# Patient Record
Sex: MALE | Race: WHITE | NOT HISPANIC OR LATINO | Employment: OTHER | URBAN - METROPOLITAN AREA
[De-identification: names, ages, dates, MRNs, and addresses within clinical notes are randomized per-mention and may not be internally consistent; named-entity substitution may affect disease eponyms.]

---

## 2017-11-18 ENCOUNTER — HOSPITAL ENCOUNTER (EMERGENCY)
Facility: MEDICAL CENTER | Age: 42
End: 2017-11-18
Attending: EMERGENCY MEDICINE
Payer: COMMERCIAL

## 2017-11-18 VITALS
DIASTOLIC BLOOD PRESSURE: 86 MMHG | SYSTOLIC BLOOD PRESSURE: 106 MMHG | WEIGHT: 192.46 LBS | HEART RATE: 102 BPM | TEMPERATURE: 98.9 F | BODY MASS INDEX: 26.07 KG/M2 | HEIGHT: 72 IN | RESPIRATION RATE: 18 BRPM | OXYGEN SATURATION: 97 %

## 2017-11-18 DIAGNOSIS — L02.91 ABSCESS: ICD-10-CM

## 2017-11-18 DIAGNOSIS — L03.114 CELLULITIS OF LEFT UPPER EXTREMITY: ICD-10-CM

## 2017-11-18 PROCEDURE — 303977 HCHG I & D

## 2017-11-18 PROCEDURE — 99283 EMERGENCY DEPT VISIT LOW MDM: CPT

## 2017-11-18 RX ORDER — HYDROCODONE BITARTRATE AND ACETAMINOPHEN 5; 325 MG/1; MG/1
1-2 TABLET ORAL EVERY 6 HOURS PRN
Qty: 6 TAB | Refills: 0 | Status: ON HOLD | OUTPATIENT
Start: 2017-11-18 | End: 2022-01-01

## 2017-11-18 RX ORDER — SULFAMETHOXAZOLE AND TRIMETHOPRIM 800; 160 MG/1; MG/1
1 TABLET ORAL 2 TIMES DAILY
Qty: 14 TAB | Refills: 0 | Status: SHIPPED | OUTPATIENT
Start: 2017-11-18 | End: 2017-11-23

## 2017-11-18 RX ORDER — CEPHALEXIN 500 MG/1
500 CAPSULE ORAL 3 TIMES DAILY
Qty: 15 CAP | Refills: 0 | Status: SHIPPED | OUTPATIENT
Start: 2017-11-18 | End: 2017-11-23

## 2017-11-18 ASSESSMENT — PAIN SCALES - GENERAL: PAINLEVEL_OUTOF10: 10

## 2017-11-18 ASSESSMENT — LIFESTYLE VARIABLES: DO YOU DRINK ALCOHOL: NO

## 2017-11-18 NOTE — ED NOTES
Pt ambulates to room.  Open wound noted to left elbow area.  Small red wound noted to R shin.  A&ox4.  Chart up for ERP evaluation.

## 2017-11-18 NOTE — ED PROVIDER NOTES
ED Provider Note    CHIEF COMPLAINT  Chief Complaint   Patient presents with   • Abscess     R shin dime sized raised red    • Wound Check     recent tattoo to L FA, peeling and ozzing       HPI  Fam Lopez is a 42 y.o. male who presents with pustule on his right lower leg for the last 2 days, he thinks he was bitten by a spider. No fever no chills. Has an additional complaint of a recent tattoo placed near the antecubital fossa of his left arm that he also SYSTEMS playing. No fever no chills no nausea no vomiting no diarrhea. Evidently the tattoo placement was very recent scan has been feeling. However he tells me that skin is peeled similar fashion on previous tattoos    REVIEW OF SYSTEMS  See HPI for further details. All other systems are negative.     PAST MEDICAL HISTORY  History reviewed. No pertinent past medical history.    FAMILY HISTORY  No family history on file.    SOCIAL HISTORY  Social History     Social History   • Marital status: Single     Spouse name: N/A   • Number of children: N/A   • Years of education: N/A     Social History Main Topics   • Smoking status: Current Every Day Smoker     Packs/day: 1.00     Types: Cigarettes   • Smokeless tobacco: Not on file   • Alcohol use Yes   • Drug use: No   • Sexual activity: Not on file     Other Topics Concern   • Not on file     Social History Narrative   • No narrative on file       SURGICAL HISTORY  History reviewed. No pertinent surgical history.    CURRENT MEDICATIONS  Home Medications    **Home medications have not yet been reviewed for this encounter**         ALLERGIES  No Known Allergies    PHYSICAL EXAM  VITAL SIGNS: /86   Pulse (!) 102   Temp 36.9 °C (98.5 °F) (Temporal)   Resp 18   Ht 1.829 m (6')   Wt 87.3 kg (192 lb 7.4 oz)   SpO2 97%   BMI 26.10 kg/m²   Constitutional: Well developed, Well nourished, No acute distress, Non-toxic appearance.   Extremities: Intact distal pulses,Right lower leg, lateral, upper portion of the right  lower leg there is a small pustule, No cyanosis, No clubbing.   Musculoskeletal: Good range of motion in all major joints. No tenderness to palpation or major deformities noted. Examination of the left upper arm, recent placement of a tattoo with some tenderness around the area of the tattoo about 2 inches in diameter. No swelling. Difficult to tell if there is redness or not because some of the tattoo is red. There is no evidence of an abscess  Neurologic: Alert & oriented x 3, Normal motor function, Normal sensory function, No focal deficits noted.   Psychiatric: Affect normal, Judgment normal, Mood normal.       RADIOLOGY/PROCEDURES      COURSE & MEDICAL DECISION MAKING  Pertinent Labs & Imaging studies reviewed. (See chart for details)    Has an abscess right lower leg and I have drained. Also cellulitis left upper arm. I'm placing him on Keflex, Bactrim, he is to return if no better in 12 hours.  FINAL IMPRESSION  1.   1. Abscess    2. Cellulitis of left upper extremity        2.   3.     Disposition  Procedure note: There is a small pustule, right upper portion of the right upper leg. She has a small abscess was drained by me with scissors. Small amount of pus.   Discharge instructions are understood. This patient is to return if fever vomiting or no better in 12 hours. Follow up with the [facility] clinic or private physician. Information sheets on sepsis and cellulitis  Electronically signed by: Brett Hope, 11/18/2017 3:32 PM

## 2017-11-18 NOTE — DISCHARGE INSTRUCTIONS
Cellulitis  Cellulitis is an infection of the skin and the tissue beneath it. The infected area is usually red and tender. Cellulitis occurs most often in the arms and lower legs.   CAUSES   Cellulitis is caused by bacteria that enter the skin through cracks or cuts in the skin. The most common types of bacteria that cause cellulitis are staphylococci and streptococci.  SIGNS AND SYMPTOMS   · Redness and warmth.  · Swelling.  · Tenderness or pain.  · Fever.  DIAGNOSIS   Your health care provider can usually determine what is wrong based on a physical exam. Blood tests may also be done.  TREATMENT   Treatment usually involves taking an antibiotic medicine.  HOME CARE INSTRUCTIONS   · Take your antibiotic medicine as directed by your health care provider. Finish the antibiotic even if you start to feel better.  · Keep the infected arm or leg elevated to reduce swelling.  · Apply a warm cloth to the affected area up to 4 times per day to relieve pain.  · Take medicines only as directed by your health care provider.  · Keep all follow-up visits as directed by your health care provider.  SEEK MEDICAL CARE IF:   · You notice red streaks coming from the infected area.  · Your red area gets larger or turns dark in color.  · Your bone or joint underneath the infected area becomes painful after the skin has healed.  · Your infection returns in the same area or another area.  · You notice a swollen bump in the infected area.  · You develop new symptoms.  · You have a fever.  SEEK IMMEDIATE MEDICAL CARE IF:   · You feel very sleepy.  · You develop vomiting or diarrhea.  · You have a general ill feeling (malaise) with muscle aches and pains.  MAKE SURE YOU:   · Understand these instructions.  · Will watch your condition.  · Will get help right away if you are not doing well or get worse.     This information is not intended to replace advice given to you by your health care provider. Make sure you discuss any questions you have  with your health care provider.     Document Released: 09/27/2006 Document Revised: 01/08/2016 Document Reviewed: 03/04/2013  [employer] Interactive Patient Education ©2016 [employer]      Cellulitis  Cellulitis is an infection of the skin and the tissue under the skin. The infected area is usually red and tender. This happens most often in the arms and lower legs.  HOME CARE   · Take your antibiotic medicine as told. Finish the medicine even if you start to feel better.  · Keep the infected arm or leg raised (elevated).  · Put a warm cloth on the area up to 4 times per day.  · Only take medicines as told by your doctor.  · Keep all doctor visits as told.  GET HELP IF:  · You see red streaks on the skin coming from the infected area.  · Your red area gets bigger or turns a dark color.  · Your bone or joint under the infected area is painful after the skin heals.  · Your infection comes back in the same area or different area.  · You have a puffy (swollen) bump in the infected area.  · You have new symptoms.  · You have a fever.  GET HELP RIGHT AWAY IF:   · You feel very sleepy.  · You throw up (vomit) or have watery poop (diarrhea).  · You feel sick and have muscle aches and pains.  MAKE SURE YOU:   · Understand these instructions.  · Will watch your condition.  · Will get help right away if you are not doing well or get worse.     This information is not intended to replace advice given to you by your health care provider. Make sure you discuss any questions you have with your health care provider.     Document Released: 06/05/2009 Document Revised: 01/08/2016 Document Reviewed: 03/04/2013  [employer] Interactive Patient Education ©2016 [employer].  Return if fever, vomiting or if no better in 12 hours.

## 2017-11-19 NOTE — ED NOTES
Discharge instructions given, pt verbalized understanding.  Prescription instructions given, pt verbalized understanding.  Understands NOT to drive or drink alcohol while taking narcotics.  A&ox4.  VSS.  Ambulates out of ER.

## 2018-01-29 ENCOUNTER — APPOINTMENT (OUTPATIENT)
Dept: RADIOLOGY | Facility: MEDICAL CENTER | Age: 43
End: 2018-01-29
Attending: EMERGENCY MEDICINE
Payer: COMMERCIAL

## 2018-01-29 ENCOUNTER — HOSPITAL ENCOUNTER (EMERGENCY)
Facility: MEDICAL CENTER | Age: 43
End: 2018-01-29
Attending: EMERGENCY MEDICINE
Payer: COMMERCIAL

## 2018-01-29 VITALS
TEMPERATURE: 98.3 F | OXYGEN SATURATION: 95 % | RESPIRATION RATE: 16 BRPM | WEIGHT: 190.48 LBS | BODY MASS INDEX: 25.8 KG/M2 | SYSTOLIC BLOOD PRESSURE: 138 MMHG | HEIGHT: 72 IN | HEART RATE: 81 BPM | DIASTOLIC BLOOD PRESSURE: 87 MMHG

## 2018-01-29 DIAGNOSIS — S60.052A CONTUSION OF LEFT LITTLE FINGER WITHOUT DAMAGE TO NAIL, INITIAL ENCOUNTER: ICD-10-CM
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
## 2018-01-29 DIAGNOSIS — S09.92XA NASAL INJURY, INITIAL ENCOUNTER: ICD-10-CM

## 2018-01-29 PROCEDURE — 73130 X-RAY EXAM OF HAND: CPT | Mod: LT

## 2018-01-29 PROCEDURE — 99284 EMERGENCY DEPT VISIT MOD MDM: CPT

## 2018-01-29 PROCEDURE — 302874 HCHG BANDAGE ACE 2 OR 3""

## 2018-01-29 ASSESSMENT — PAIN SCALES - GENERAL: PAINLEVEL_OUTOF10: 0

## 2018-01-29 NOTE — DISCHARGE INSTRUCTIONS
Nasal Fracture  A nasal fracture is a break or crack in the bones of the nose. A minor break usually heals in a month. You often will receive black eyes from a nasal fracture. This is not a cause for concern. The black eyes will go away over 1 to 2 weeks.   DIAGNOSIS   Your caregiver may want to examine you if you are concerned about a fracture of the nose. X-rays of the nose may not show a nasal fracture even when one is present. Sometimes your caregiver must wait 1 to 5 days after the injury to re-check the nose for alignment and to take additional X-rays. Sometimes the caregiver must wait until the swelling has gone down.  TREATMENT  Minor fractures that have caused no deformity often do not require treatment. More serious fractures where bones are displaced may require surgery. This will take place after the swelling is gone. Surgery will stabilize and align the fracture.  HOME CARE INSTRUCTIONS   · Put ice on the injured area.  ¨ Put ice in a plastic bag.  ¨ Place a towel between your skin and the bag.  ¨ Leave the ice on for 15-20 minutes, 03-04 times a day.  · Take medications as directed by your caregiver.  · Only take over-the-counter or prescription medicines for pain, discomfort, or fever as directed by your caregiver.  · If your nose starts bleeding, squeeze the soft parts of the nose against the center wall while you are sitting in an upright position for 10 minutes.  · Contact sports should be avoided for at least 3 to 4 weeks or as directed by your caregiver.  SEEK MEDICAL CARE IF:  · Your pain increases or becomes severe.  · You continue to have nosebleeds.  · The shape of your nose does not return to normal within 5 days.  · You have pus draining from the nose.  SEEK IMMEDIATE MEDICAL CARE IF:   · You have bleeding from your nose that does not stop after 20 minutes of pinching the nostrils closed and keeping ice on the nose.  · You have clear fluid draining from your nose.  · You notice a grape-like  swelling on the dividing wall between the nostrils (septum). This is a collection of blood (hematoma) that must be drained to help prevent infection.  · You have difficulty moving your eyes.  · You have recurrent vomiting.     This information is not intended to replace advice given to you by your health care provider. Make sure you discuss any questions you have with your health care provider.     Document Released: 12/15/2001 Document Revised: 03/11/2013 Document Reviewed: 01/25/2016  [employer] Interactive Patient Education ©2016 [employer].  Crush Injury, Fingers or Toes  A crush injury to the fingers or toes means the tissues have been damaged by being squeezed (compressed). There will be bleeding into the tissues and swelling. Often, blood will collect under the skin. When this happens, the skin on the finger often dies and may slough off (shed) 1 week to 10 days later. Usually, new skin is growing underneath. If the injury has been too severe and the tissue does not survive, the damaged tissue may begin to turn black over several days.   Wounds which occur because of the crushing may be stitched (sutured) shut. However, crush injuries are more likely to become infected than other injuries. These wounds may not be closed as tightly as other types of cuts to prevent infection. Nails involved are often lost. These usually grow back over several weeks.   DIAGNOSIS  X-rays may be taken to see if there is any injury to the bones.  TREATMENT  Broken bones (fractures) may be treated with splinting, depending on the fracture. Often, no treatment is required for fractures of the last bone in the fingers or toes.  HOME CARE INSTRUCTIONS   · The crushed part should be raised (elevated) above the heart or center of the chest as much as possible for the first several days or as directed. This helps with pain and lessens swelling. Less swelling increases the chances that the crushed part will survive.  · Put ice on the  injured area.  ¨ Put ice in a plastic bag.  ¨ Place a towel between your skin and the bag.  ¨ Leave the ice on for 15-20 minutes, 03-04 times a day for the first 2 days.  · Only take over-the-counter or prescription medicines for pain, discomfort, or fever as directed by your caregiver.  · Use your injured part only as directed.  · Change your bandages (dressings) as directed.  · Keep all follow-up appointments as directed by your caregiver. Not keeping your appointment could result in a chronic or permanent injury, pain, and disability. If there is any problem keeping the appointment, you must call to reschedule.  SEEK IMMEDIATE MEDICAL CARE IF:   · There is redness, swelling, or increasing pain in the wound area.  · Pus is coming from the wound.  · You have a fever.  · You notice a bad smell coming from the wound or dressing.  · The edges of the wound do not stay together after the sutures have been removed.  · You are unable to move the injured finger or toe.  MAKE SURE YOU:   · Understand these instructions.  · Will watch your condition.  · Will get help right away if you are not doing well or get worse.     This information is not intended to replace advice given to you by your health care provider. Make sure you discuss any questions you have with your health care provider.     Document Released: 12/18/2006 Document Revised: 03/11/2013 Document Reviewed: 05/04/2012  [employer] Interactive Patient Education ©2016 [employer]

## 2018-01-29 NOTE — ED PROVIDER NOTES
ED Provider Note    Scribed for Carolynn Neff M.D. by Xin Espinoza. 1/29/2018  1:30 PM    Primary care provider: Chris Love M.D.  Means of arrival: Walk-in  History obtained from: Patient  History limited by: None     CHIEF COMPLAINT  Chief Complaint   Patient presents with   • T-5000 GLF     Fell out of bed. Pain to lateral L hand.  Swelling to base of 5th finger.  No relief with ice.     CHERRY Lopez is a 42 y.o. male who presents to the Emergency Department following a ground level fall which occurred two days ago resulting in left lateral hand pain. Per patient, he was walking in the dark on his porch when he fell forward. At that time the patient hit the brdige of his nose and injured his left hand. He reports history of breaking his nose multiple times in the past and states he currently cannot breathe well out of his nose. He currently complains of nose pain but denies any other facial pain. Patient complains of swelling to the base of his pinky. He denies any previous injuries to his left hand. He tried icing the area with no improvement of pain. He denies loss of consciousness upon falling. Patient denies any other injuries at this time. He also reports having a cellulitis last month on the bicep area of his left arm which still has scar tissue and feels as if it is still healing. He denies development of any redness or warmth to the area recently. Patient denies history of diabetes or any other medical problems.     REVIEW OF SYSTEMS  HEENT: Positive for nose pain. No head injuries.   Neuro: No numbness. No loss of consciousness.  Musculoskeletal: Positive for left pinky pain and swelling.   SKIN: No contusions   E.  See history of present illness.     PAST MEDICAL HISTORY   No chronic illnesses reported.     SURGICAL HISTORY  patient denies any surgical history    SOCIAL HISTORY  Social History   Substance Use Topics   • Smoking status: Current Every Day Smoker     Packs/day: 1.00     Types:  Cigarettes   • Smokeless tobacco: Never Used   • Alcohol use Yes      History   Drug Use No       FAMILY HISTORY  History reviewed. No pertinent family history.    CURRENT MEDICATIONS  No daily medications.     ALLERGIES  No Known Allergies    PHYSICAL EXAM  VITAL SIGNS: /88   Pulse 91   Temp 36.8 °C (98.3 °F) (Temporal)   Resp 16   Ht 1.829 m (6')   Wt 86.4 kg (190 lb 7.6 oz)   SpO2 95%   BMI 25.83 kg/m²     Constitutional: No distress.  HENT: Normocephalic. Superficial scratch to the bridge of the nose. Septum is deviated to right. No septal hematoma. No other facial tenderness.   Skin: Old scar tissue to left bicep area. No redness, swelling or warmth.   Musculoskeletal: Swelling and tenderness to the fifth MCP joint of the left hand. Pain with flexion. No obvious bony deformity. No crepitus. Sensation is intact.   Vascular: Warm to touch good capillary refill.  Neurologic: Distally neurovascularly intact  Psychiatric: Affect normal    DIAGNOSTIC STUDIES / PROCEDURES    LABS  None.     RADIOLOGY  DX-HAND 3+ LEFT   Final Result      1.  No acute fracture      2.  Old, healed fracture distal aspect of fifth proximal phalanx        The radiologist's interpretation of all radiological studies have been reviewed by me.    COURSE & MEDICAL DECISION MAKING  Nursing notes, VS, PMSFHx reviewed in chart.     1:30 PM - Patient seen and examined at bedside. Ordered left hand x-ray to evaluate his symptoms.     2:32 PM Reviewed patient's radiology results, which are shown above. Ordered splint application.     2:35 PM Recheck: Patient re-evaluated at beside. I informed the patient of his radiology results, which reveal no evidence of fracture. I informed the patient I will provide him with follow-up information with our plastic surgeon on-call, Dr. Phillips for his nose injury. We also discussed resting his hand and wearing the splint we will provide for 1-2 weeks, based off of his symptomatic improvement. 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 I also will provide follow-up information for your on-call orthopedist, Dr. Fernandes. Patient was agreeable and had the opportunity for questions.     The patient will return for new or worsening symptoms and is stable at the time of discharge.    The patient is referred to a primary physician for blood pressure management, diabetic screening, and for all other preventative health concerns.    DISPOSITION:  Patient will be discharged home in stable condition.    FOLLOW UP:  Demond Phillips M.D.  [address]  [place] [address]  [phone]    Call in 1 day  to establish care, for recheck    Ramses Fernandes M.D.  [address]  [place] [address]  [phone]    Call in 1 day  for recheck, to establish care for hand      FINAL IMPRESSION  1. Nasal injury, initial encounter    2. Fall, initial encounter    3. Contusion of left little finger without damage to nail, initial encounter          Xin NEWELL (Scribe), am scribing for, and in the presence of, Carolynn Neff M.D..    Electronically signed by: Xin Espinoza (Scribe), 1/29/2018    Carolynn NEWELL M.D. personally performed the services described in this documentation, as scribed by Xin Espinoza in my presence, and it is both accurate and complete.    The note accurately reflects work and decisions made by me.  Carolynn Neff  1/29/2018  8:29 PM

## 2018-01-29 NOTE — ED TRIAGE NOTES
Fam Lopez 42 y.o. male   Chief Complaint   Patient presents with   • T-5000 GLF     Fell out of bed. Pain to lateral L hand.  Swelling to base of 5th finger.  No relief with ice.      Pt returned to lobby and educated on triage process.  Advised to notify RN with changes or concerns.

## 2021-01-01 ENCOUNTER — NON-PROVIDER VISIT (OUTPATIENT)
Dept: URGENT CARE | Facility: CLINIC | Age: 46
End: 2021-01-01

## 2021-01-01 DIAGNOSIS — Z02.1 PRE-EMPLOYMENT DRUG SCREENING: ICD-10-CM

## 2021-01-01 LAB
AMP AMPHETAMINE: NORMAL
COC COCAINE: NORMAL
INT CON NEG: NORMAL
INT CON POS: NORMAL
MET METHAMPHETAMINES: NORMAL
OPI OPIATES: NORMAL
PCP PHENCYCLIDINE: NORMAL
POC DRUG COMMENT 753798-OCCUPATIONAL HEALTH: NORMAL
THC: NORMAL

## 2021-01-01 PROCEDURE — 80305 DRUG TEST PRSMV DIR OPT OBS: CPT | Performed by: NURSE PRACTITIONER

## 2022-01-01 ENCOUNTER — HOSPITAL ENCOUNTER (INPATIENT)
Facility: MEDICAL CENTER | Age: 47
LOS: 2 days | DRG: 432 | End: 2022-03-14
Attending: EMERGENCY MEDICINE | Admitting: EMERGENCY MEDICINE
Payer: COMMERCIAL

## 2022-01-01 ENCOUNTER — ANESTHESIA EVENT (OUTPATIENT)
Dept: SURGERY | Facility: MEDICAL CENTER | Age: 47
DRG: 432 | End: 2022-01-01
Payer: COMMERCIAL

## 2022-01-01 ENCOUNTER — APPOINTMENT (OUTPATIENT)
Dept: RADIOLOGY | Facility: MEDICAL CENTER | Age: 47
DRG: 432 | End: 2022-01-01
Attending: INTERNAL MEDICINE
Payer: COMMERCIAL

## 2022-01-01 ENCOUNTER — HOSPICE ADMISSION (OUTPATIENT)
Dept: HOSPICE | Facility: HOSPICE | Age: 47
End: 2022-01-01

## 2022-01-01 ENCOUNTER — ANESTHESIA (OUTPATIENT)
Dept: SURGERY | Facility: MEDICAL CENTER | Age: 47
DRG: 432 | End: 2022-01-01
Payer: COMMERCIAL

## 2022-01-01 ENCOUNTER — APPOINTMENT (OUTPATIENT)
Dept: CARDIOLOGY | Facility: MEDICAL CENTER | Age: 47
DRG: 432 | End: 2022-01-01
Attending: INTERNAL MEDICINE
Payer: COMMERCIAL

## 2022-01-01 ENCOUNTER — APPOINTMENT (OUTPATIENT)
Dept: RADIOLOGY | Facility: MEDICAL CENTER | Age: 47
DRG: 432 | End: 2022-01-01
Attending: EMERGENCY MEDICINE
Payer: COMMERCIAL

## 2022-01-01 VITALS
WEIGHT: 218.03 LBS | SYSTOLIC BLOOD PRESSURE: 92 MMHG | TEMPERATURE: 99.3 F | DIASTOLIC BLOOD PRESSURE: 31 MMHG | HEIGHT: 71 IN | BODY MASS INDEX: 30.52 KG/M2 | OXYGEN SATURATION: 93 % | RESPIRATION RATE: 16 BRPM | HEART RATE: 121 BPM

## 2022-01-01 DIAGNOSIS — N17.9 AKI (ACUTE KIDNEY INJURY) (HCC): ICD-10-CM

## 2022-01-01 DIAGNOSIS — K92.2 UPPER GI BLEED: ICD-10-CM

## 2022-01-01 DIAGNOSIS — R57.9 SHOCK (HCC): ICD-10-CM

## 2022-01-01 DIAGNOSIS — K92.0 GASTROINTESTINAL HEMORRHAGE WITH HEMATEMESIS: ICD-10-CM

## 2022-01-01 DIAGNOSIS — R10.84 GENERALIZED ABDOMINAL PAIN: ICD-10-CM

## 2022-01-01 DIAGNOSIS — K92.0 HEMATEMESIS, PRESENCE OF NAUSEA NOT SPECIFIED: ICD-10-CM

## 2022-01-01 DIAGNOSIS — R18.8 OTHER ASCITES: ICD-10-CM

## 2022-01-01 DIAGNOSIS — D62 ACUTE BLOOD LOSS ANEMIA: ICD-10-CM

## 2022-01-01 DIAGNOSIS — D50.0 BLOOD LOSS ANEMIA: ICD-10-CM

## 2022-01-01 LAB
ABO + RH BLD: NORMAL
ABO GROUP BLD: NORMAL
ALBUMIN SERPL BCP-MCNC: 1.9 G/DL (ref 3.2–4.9)
ALBUMIN SERPL BCP-MCNC: 1.9 G/DL (ref 3.2–4.9)
ALBUMIN SERPL BCP-MCNC: 2.2 G/DL (ref 3.2–4.9)
ALBUMIN SERPL BCP-MCNC: 2.6 G/DL (ref 3.2–4.9)
ALBUMIN SERPL BCP-MCNC: 2.9 G/DL (ref 3.2–4.9)
ALBUMIN/GLOB SERPL: 0.7 G/DL
ALBUMIN/GLOB SERPL: 0.7 G/DL
ALBUMIN/GLOB SERPL: 0.8 G/DL
ALBUMIN/GLOB SERPL: 0.9 G/DL
ALBUMIN/GLOB SERPL: 1 G/DL
ALP SERPL-CCNC: 101 U/L (ref 30–99)
ALP SERPL-CCNC: 103 U/L (ref 30–99)
ALP SERPL-CCNC: 107 U/L (ref 30–99)
ALP SERPL-CCNC: 111 U/L (ref 30–99)
ALP SERPL-CCNC: 95 U/L (ref 30–99)
ALT SERPL-CCNC: 264 U/L (ref 2–50)
ALT SERPL-CCNC: 388 U/L (ref 2–50)
ALT SERPL-CCNC: 508 U/L (ref 2–50)
ALT SERPL-CCNC: 542 U/L (ref 2–50)
ALT SERPL-CCNC: 735 U/L (ref 2–50)
ANION GAP SERPL CALC-SCNC: 10 MMOL/L (ref 7–16)
ANION GAP SERPL CALC-SCNC: 11 MMOL/L (ref 7–16)
ANION GAP SERPL CALC-SCNC: 11 MMOL/L (ref 7–16)
ANION GAP SERPL CALC-SCNC: 15 MMOL/L (ref 7–16)
ANION GAP SERPL CALC-SCNC: 22 MMOL/L (ref 7–16)
ANION GAP SERPL CALC-SCNC: 23 MMOL/L (ref 7–16)
ANION GAP SERPL CALC-SCNC: 8 MMOL/L (ref 7–16)
ANISOCYTOSIS BLD QL SMEAR: ABNORMAL
APTT PPP: 39.8 SEC (ref 24.7–36)
AST SERPL-CCNC: 1066 U/L (ref 12–45)
AST SERPL-CCNC: 1610 U/L (ref 12–45)
AST SERPL-CCNC: 401 U/L (ref 12–45)
AST SERPL-CCNC: 646 U/L (ref 12–45)
AST SERPL-CCNC: 953 U/L (ref 12–45)
BARCODED ABORH UBTYP: 600
BARCODED ABORH UBTYP: 6200
BARCODED ABORH UBTYP: 9500
BARCODED PRD CODE UBPRD: NORMAL
BARCODED UNIT NUM UBUNT: NORMAL
BASE EXCESS BLDA CALC-SCNC: -10 MMOL/L (ref -4–3)
BASE EXCESS BLDA CALC-SCNC: -10 MMOL/L (ref -4–3)
BASE EXCESS BLDA CALC-SCNC: -14 MMOL/L (ref -4–3)
BASE EXCESS BLDA CALC-SCNC: -9 MMOL/L (ref -4–3)
BASOPHILS # BLD AUTO: 0.1 % (ref 0–1.8)
BASOPHILS # BLD AUTO: 1.7 % (ref 0–1.8)
BASOPHILS # BLD: 0.02 K/UL (ref 0–0.12)
BASOPHILS # BLD: 0.13 K/UL (ref 0–0.12)
BILIRUB SERPL-MCNC: 0.9 MG/DL (ref 0.1–1.5)
BILIRUB SERPL-MCNC: 1.3 MG/DL (ref 0.1–1.5)
BILIRUB SERPL-MCNC: 2.4 MG/DL (ref 0.1–1.5)
BILIRUB SERPL-MCNC: 2.8 MG/DL (ref 0.1–1.5)
BILIRUB SERPL-MCNC: 4.8 MG/DL (ref 0.1–1.5)
BLD GP AB SCN SERPL QL: NORMAL
BLOOD CULTURE HOLD CXBCH: NORMAL
BLOOD CULTURE HOLD CXBCH: NORMAL
BODY TEMPERATURE: ABNORMAL DEGREES
BREATHS SETTING VENT: 24
BREATHS SETTING VENT: 24
BREATHS SETTING VENT: 26
BUN SERPL-MCNC: 40 MG/DL (ref 8–22)
BUN SERPL-MCNC: 40 MG/DL (ref 8–22)
BUN SERPL-MCNC: 41 MG/DL (ref 8–22)
BUN SERPL-MCNC: 46 MG/DL (ref 8–22)
BUN SERPL-MCNC: 47 MG/DL (ref 8–22)
BUN SERPL-MCNC: 51 MG/DL (ref 8–22)
BUN SERPL-MCNC: 55 MG/DL (ref 8–22)
CA-I BLD ISE-SCNC: 1.07 MMOL/L (ref 1.1–1.3)
CA-I BLD ISE-SCNC: 1.28 MMOL/L (ref 1.1–1.3)
CA-I SERPL-SCNC: 1 MMOL/L (ref 1.1–1.3)
CA-I SERPL-SCNC: 1.1 MMOL/L (ref 1.1–1.3)
CALCIUM SERPL-MCNC: 7.7 MG/DL (ref 8.5–10.5)
CALCIUM SERPL-MCNC: 7.7 MG/DL (ref 8.5–10.5)
CALCIUM SERPL-MCNC: 7.8 MG/DL (ref 8.5–10.5)
CALCIUM SERPL-MCNC: 7.8 MG/DL (ref 8.5–10.5)
CALCIUM SERPL-MCNC: 8.2 MG/DL (ref 8.5–10.5)
CALCIUM SERPL-MCNC: 8.5 MG/DL (ref 8.5–10.5)
CALCIUM SERPL-MCNC: 9.1 MG/DL (ref 8.5–10.5)
CFT BLD TEG: 3.2 MIN (ref 4.6–9.1)
CFT BLD TEG: 4.2 MIN (ref 4.6–9.1)
CFT P HPASE BLD TEG: 3 MIN (ref 4.3–8.3)
CFT P HPASE BLD TEG: 4 MIN (ref 4.3–8.3)
CHLORIDE SERPL-SCNC: 101 MMOL/L (ref 96–112)
CHLORIDE SERPL-SCNC: 101 MMOL/L (ref 96–112)
CHLORIDE SERPL-SCNC: 102 MMOL/L (ref 96–112)
CHLORIDE SERPL-SCNC: 106 MMOL/L (ref 96–112)
CHLORIDE SERPL-SCNC: 106 MMOL/L (ref 96–112)
CHLORIDE SERPL-SCNC: 108 MMOL/L (ref 96–112)
CHLORIDE SERPL-SCNC: 110 MMOL/L (ref 96–112)
CLOT ANGLE BLD TEG: 77.8 DEGREES (ref 63–78)
CLOT ANGLE BLD TEG: 78.7 DEGREES (ref 63–78)
CLOT LYSIS 30M P MA LENFR BLD TEG: 1.1 % (ref 0–2.6)
CLOT LYSIS 30M P MA LENFR BLD TEG: 1.4 % (ref 0–2.6)
CO2 BLDA-SCNC: 15 MMOL/L (ref 20–33)
CO2 BLDA-SCNC: 18 MMOL/L (ref 20–33)
CO2 BLDA-SCNC: 18 MMOL/L (ref 20–33)
CO2 BLDA-SCNC: 22 MMOL/L (ref 20–33)
CO2 SERPL-SCNC: 11 MMOL/L (ref 20–33)
CO2 SERPL-SCNC: 12 MMOL/L (ref 20–33)
CO2 SERPL-SCNC: 15 MMOL/L (ref 20–33)
CO2 SERPL-SCNC: 15 MMOL/L (ref 20–33)
CO2 SERPL-SCNC: 16 MMOL/L (ref 20–33)
CO2 SERPL-SCNC: 17 MMOL/L (ref 20–33)
CO2 SERPL-SCNC: 18 MMOL/L (ref 20–33)
COMPONENT F 8504F: NORMAL
COMPONENT R 8504R: NORMAL
CORTIS SERPL-MCNC: 12.1 UG/DL (ref 0–23)
CREAT SERPL-MCNC: 1.41 MG/DL (ref 0.5–1.4)
CREAT SERPL-MCNC: 1.75 MG/DL (ref 0.5–1.4)
CREAT SERPL-MCNC: 1.82 MG/DL (ref 0.5–1.4)
CREAT SERPL-MCNC: 2.1 MG/DL (ref 0.5–1.4)
CREAT SERPL-MCNC: 2.34 MG/DL (ref 0.5–1.4)
CREAT SERPL-MCNC: 2.57 MG/DL (ref 0.5–1.4)
CREAT SERPL-MCNC: 2.89 MG/DL (ref 0.5–1.4)
CT.EXTRINSIC BLD ROTEM: 0.8 MIN (ref 0.8–2.1)
CT.EXTRINSIC BLD ROTEM: 0.9 MIN (ref 0.8–2.1)
DELSYS IDSYS: ABNORMAL
EKG IMPRESSION: NORMAL
END TIDAL CARBON DIOXIDE IECO2: 31 MMHG
END TIDAL CARBON DIOXIDE IECO2: 34 MMHG
END TIDAL CARBON DIOXIDE IECO2: 36 MMHG
EOSINOPHIL # BLD AUTO: 0 K/UL (ref 0–0.51)
EOSINOPHIL # BLD AUTO: 0 K/UL (ref 0–0.51)
EOSINOPHIL NFR BLD: 0 % (ref 0–6.9)
EOSINOPHIL NFR BLD: 0 % (ref 0–6.9)
ERYTHROCYTE [DISTWIDTH] IN BLOOD BY AUTOMATED COUNT: 70.9 FL (ref 35.9–50)
ERYTHROCYTE [DISTWIDTH] IN BLOOD BY AUTOMATED COUNT: 73.5 FL (ref 35.9–50)
ERYTHROCYTE [DISTWIDTH] IN BLOOD BY AUTOMATED COUNT: 76.7 FL (ref 35.9–50)
ERYTHROCYTE [DISTWIDTH] IN BLOOD BY AUTOMATED COUNT: 78.5 FL (ref 35.9–50)
EXTERNAL QUALITY CONTROL: NORMAL
GLOBULIN SER CALC-MCNC: 2.5 G/DL (ref 1.9–3.5)
GLOBULIN SER CALC-MCNC: 2.8 G/DL (ref 1.9–3.5)
GLOBULIN SER CALC-MCNC: 2.9 G/DL (ref 1.9–3.5)
GLOBULIN SER CALC-MCNC: 2.9 G/DL (ref 1.9–3.5)
GLOBULIN SER CALC-MCNC: 3 G/DL (ref 1.9–3.5)
GLUCOSE BLD STRIP.AUTO-MCNC: 103 MG/DL (ref 65–99)
GLUCOSE BLD STRIP.AUTO-MCNC: 56 MG/DL (ref 65–99)
GLUCOSE SERPL-MCNC: 105 MG/DL (ref 65–99)
GLUCOSE SERPL-MCNC: 123 MG/DL (ref 65–99)
GLUCOSE SERPL-MCNC: 52 MG/DL (ref 65–99)
GLUCOSE SERPL-MCNC: 70 MG/DL (ref 65–99)
GLUCOSE SERPL-MCNC: 94 MG/DL (ref 65–99)
GLUCOSE SERPL-MCNC: 97 MG/DL (ref 65–99)
GLUCOSE SERPL-MCNC: 98 MG/DL (ref 65–99)
GRAM STN SPEC: NORMAL
HAV IGM SERPL QL IA: ABNORMAL
HBV CORE IGM SER QL: ABNORMAL
HBV SURFACE AB SERPL IA-ACNC: <3.5 MIU/ML (ref 0–10)
HBV SURFACE AG SER QL: ABNORMAL
HCO3 BLDA-SCNC: 13.5 MMOL/L (ref 17–25)
HCO3 BLDA-SCNC: 17.1 MMOL/L (ref 17–25)
HCO3 BLDA-SCNC: 17.2 MMOL/L (ref 17–25)
HCO3 BLDA-SCNC: 20 MMOL/L (ref 17–25)
HCT VFR BLD AUTO: 17.6 % (ref 42–52)
HCT VFR BLD AUTO: 21.5 % (ref 42–52)
HCT VFR BLD AUTO: 23.5 % (ref 42–52)
HCT VFR BLD AUTO: 29.7 % (ref 42–52)
HCT VFR BLD AUTO: 29.9 % (ref 42–52)
HCT VFR BLD AUTO: 32.4 % (ref 42–52)
HCT VFR BLD CALC: 27 % (ref 42–52)
HCT VFR BLD CALC: 31 % (ref 42–52)
HCV AB SER QL: REACTIVE
HGB BLD-MCNC: 10.3 G/DL (ref 14–18)
HGB BLD-MCNC: 10.5 G/DL (ref 14–18)
HGB BLD-MCNC: 10.5 G/DL (ref 14–18)
HGB BLD-MCNC: 5.4 G/DL (ref 14–18)
HGB BLD-MCNC: 6.9 G/DL (ref 14–18)
HGB BLD-MCNC: 7.6 G/DL (ref 14–18)
HGB BLD-MCNC: 9.2 G/DL (ref 14–18)
HGB BLD-MCNC: 9.5 G/DL (ref 14–18)
HIV 1+2 AB+HIV1 P24 AG SERPL QL IA: NORMAL
HOROWITZ INDEX BLDA+IHG-RTO: 119 MM[HG]
HOROWITZ INDEX BLDA+IHG-RTO: 149 MM[HG]
HOROWITZ INDEX BLDA+IHG-RTO: 195 MM[HG]
HOROWITZ INDEX BLDA+IHG-RTO: 52 MM[HG]
IMM GRANULOCYTES # BLD AUTO: 0.22 K/UL (ref 0–0.11)
IMM GRANULOCYTES NFR BLD AUTO: 1.4 % (ref 0–0.9)
INR PPP: 1.64 (ref 0.87–1.13)
LACTATE BLD-SCNC: 10.9 MMOL/L (ref 0.5–2)
LACTATE BLD-SCNC: 3.4 MMOL/L (ref 0.5–2)
LACTATE BLD-SCNC: 9.9 MMOL/L (ref 0.5–2)
LIPASE SERPL-CCNC: 131 U/L (ref 11–82)
LV EJECT FRACT  99904: 70
LYMPHOCYTES # BLD AUTO: 1.76 K/UL (ref 1–4.8)
LYMPHOCYTES # BLD AUTO: 2.64 K/UL (ref 1–4.8)
LYMPHOCYTES NFR BLD: 11.6 % (ref 22–41)
LYMPHOCYTES NFR BLD: 33.9 % (ref 22–41)
MACROCYTES BLD QL SMEAR: ABNORMAL
MAGNESIUM SERPL-MCNC: 1.9 MG/DL (ref 1.5–2.5)
MAGNESIUM SERPL-MCNC: 2.1 MG/DL (ref 1.5–2.5)
MAGNESIUM SERPL-MCNC: 2.1 MG/DL (ref 1.5–2.5)
MANUAL DIFF BLD: NORMAL
MCF BLD TEG: 60.1 MM (ref 52–69)
MCF BLD TEG: 61.3 MM (ref 52–69)
MCF.PLATELET INHIB BLD ROTEM: 21.5 MM (ref 15–32)
MCF.PLATELET INHIB BLD ROTEM: 24 MM (ref 15–32)
MCH RBC QN AUTO: 32.7 PG (ref 27–33)
MCH RBC QN AUTO: 32.8 PG (ref 27–33)
MCH RBC QN AUTO: 33.7 PG (ref 27–33)
MCH RBC QN AUTO: 35.5 PG (ref 27–33)
MCHC RBC AUTO-ENTMCNC: 30.7 G/DL (ref 33.7–35.3)
MCHC RBC AUTO-ENTMCNC: 32 G/DL (ref 33.7–35.3)
MCHC RBC AUTO-ENTMCNC: 32.1 G/DL (ref 33.7–35.3)
MCHC RBC AUTO-ENTMCNC: 32.4 G/DL (ref 33.7–35.3)
MCV RBC AUTO: 100.9 FL (ref 81.4–97.8)
MCV RBC AUTO: 102.4 FL (ref 81.4–97.8)
MCV RBC AUTO: 104.9 FL (ref 81.4–97.8)
MCV RBC AUTO: 115.8 FL (ref 81.4–97.8)
MODE IMODE: ABNORMAL
MONOCYTES # BLD AUTO: 0.61 K/UL (ref 0–0.85)
MONOCYTES # BLD AUTO: 1.92 K/UL (ref 0–0.85)
MONOCYTES NFR BLD AUTO: 12.6 % (ref 0–13.4)
MONOCYTES NFR BLD AUTO: 7.8 % (ref 0–13.4)
MORPHOLOGY BLD-IMP: NORMAL
MYELOCYTES NFR BLD MANUAL: 0.9 %
NEUTROPHILS # BLD AUTO: 11.3 K/UL (ref 1.82–7.42)
NEUTROPHILS # BLD AUTO: 4.34 K/UL (ref 1.82–7.42)
NEUTROPHILS NFR BLD: 55.7 % (ref 44–72)
NEUTROPHILS NFR BLD: 74.3 % (ref 44–72)
NRBC # BLD AUTO: 0 K/UL
NRBC # BLD AUTO: 0.08 K/UL
NRBC BLD-RTO: 0 /100 WBC
NRBC BLD-RTO: 0.5 /100 WBC
O2/TOTAL GAS SETTING VFR VENT: 100 %
O2/TOTAL GAS SETTING VFR VENT: 100 %
O2/TOTAL GAS SETTING VFR VENT: 40 %
O2/TOTAL GAS SETTING VFR VENT: 70 %
PA AA BLD-ACNC: 16.4 % (ref 0–11)
PA AA BLD-ACNC: 9.9 % (ref 0–11)
PA ADP BLD-ACNC: 0 % (ref 0–17)
PA ADP BLD-ACNC: 14.9 % (ref 0–17)
PCO2 BLDA: 35.6 MMHG (ref 26–37)
PCO2 BLDA: 39 MMHG (ref 26–37)
PCO2 BLDA: 42.3 MMHG (ref 26–37)
PCO2 BLDA: 66.7 MMHG (ref 26–37)
PCO2 TEMP ADJ BLDA: 35.9 MMHG (ref 26–37)
PCO2 TEMP ADJ BLDA: 40.1 MMHG (ref 26–37)
PCO2 TEMP ADJ BLDA: 42.7 MMHG (ref 26–37)
PCO2 TEMP ADJ BLDA: 69.5 MMHG (ref 26–37)
PEEP END EXPIRATORY PRESSURE IPEEP: 14 CMH20
PEEP END EXPIRATORY PRESSURE IPEEP: 8 CMH20
PERCENT MINUTE VOLUME IPMV: 100
PH BLDA: 7.08 [PH] (ref 7.4–7.5)
PH BLDA: 7.15 [PH] (ref 7.4–7.5)
PH BLDA: 7.21 [PH] (ref 7.4–7.5)
PH BLDA: 7.29 [PH] (ref 7.4–7.5)
PH TEMP ADJ BLDA: 7.07 [PH] (ref 7.4–7.5)
PH TEMP ADJ BLDA: 7.14 [PH] (ref 7.4–7.5)
PH TEMP ADJ BLDA: 7.21 [PH] (ref 7.4–7.5)
PH TEMP ADJ BLDA: 7.29 [PH] (ref 7.4–7.5)
PHOSPHATE SERPL-MCNC: 4 MG/DL (ref 2.5–4.5)
PHOSPHATE SERPL-MCNC: 8.3 MG/DL (ref 2.5–4.5)
PLATELET # BLD AUTO: 160 K/UL (ref 164–446)
PLATELET # BLD AUTO: 164 K/UL (ref 164–446)
PLATELET # BLD AUTO: 169 K/UL (ref 164–446)
PLATELET # BLD AUTO: 173 K/UL (ref 164–446)
PLATELET BLD QL SMEAR: NORMAL
PMV BLD AUTO: 10.7 FL (ref 9–12.9)
PMV BLD AUTO: 11.2 FL (ref 9–12.9)
PMV BLD AUTO: 11.2 FL (ref 9–12.9)
PMV BLD AUTO: 11.4 FL (ref 9–12.9)
PO2 BLDA: 149 MMHG (ref 64–87)
PO2 BLDA: 52 MMHG (ref 64–87)
PO2 BLDA: 78 MMHG (ref 64–87)
PO2 BLDA: 83 MMHG (ref 64–87)
PO2 TEMP ADJ BLDA: 150 MMHG (ref 64–87)
PO2 TEMP ADJ BLDA: 56 MMHG (ref 64–87)
PO2 TEMP ADJ BLDA: 80 MMHG (ref 64–87)
PO2 TEMP ADJ BLDA: 86 MMHG (ref 64–87)
POLYCHROMASIA BLD QL SMEAR: NORMAL
POTASSIUM BLD-SCNC: 5.4 MMOL/L (ref 3.6–5.5)
POTASSIUM BLD-SCNC: 6.4 MMOL/L (ref 3.6–5.5)
POTASSIUM SERPL-SCNC: 5 MMOL/L (ref 3.6–5.5)
POTASSIUM SERPL-SCNC: 5.1 MMOL/L (ref 3.6–5.5)
POTASSIUM SERPL-SCNC: 5.8 MMOL/L (ref 3.6–5.5)
POTASSIUM SERPL-SCNC: 6.1 MMOL/L (ref 3.6–5.5)
POTASSIUM SERPL-SCNC: 6.4 MMOL/L (ref 3.6–5.5)
POTASSIUM SERPL-SCNC: 6.9 MMOL/L (ref 3.6–5.5)
POTASSIUM SERPL-SCNC: 7.2 MMOL/L (ref 3.6–5.5)
PRODUCT TYPE UPROD: NORMAL
PROT SERPL-MCNC: 4.4 G/DL (ref 6–8.2)
PROT SERPL-MCNC: 4.8 G/DL (ref 6–8.2)
PROT SERPL-MCNC: 5.2 G/DL (ref 6–8.2)
PROT SERPL-MCNC: 5.5 G/DL (ref 6–8.2)
PROT SERPL-MCNC: 5.7 G/DL (ref 6–8.2)
PROTHROMBIN TIME: 19 SEC (ref 12–14.6)
RBC # BLD AUTO: 1.52 M/UL (ref 4.7–6.1)
RBC # BLD AUTO: 2.05 M/UL (ref 4.7–6.1)
RBC # BLD AUTO: 2.9 M/UL (ref 4.7–6.1)
RBC # BLD AUTO: 3.21 M/UL (ref 4.7–6.1)
RBC BLD AUTO: PRESENT
RH BLD: NORMAL
SAO2 % BLDA: 71 % (ref 93–99)
SAO2 % BLDA: 92 % (ref 93–99)
SAO2 % BLDA: 94 % (ref 93–99)
SAO2 % BLDA: 99 % (ref 93–99)
SARS-COV+SARS-COV-2 AG RESP QL IA.RAPID: NEGATIVE
SIGNIFICANT IND 70042: NORMAL
SITE SITE: NORMAL
SMUDGE CELLS BLD QL SMEAR: NORMAL
SODIUM BLD-SCNC: 134 MMOL/L (ref 135–145)
SODIUM BLD-SCNC: 135 MMOL/L (ref 135–145)
SODIUM SERPL-SCNC: 132 MMOL/L (ref 135–145)
SODIUM SERPL-SCNC: 133 MMOL/L (ref 135–145)
SODIUM SERPL-SCNC: 134 MMOL/L (ref 135–145)
SODIUM SERPL-SCNC: 134 MMOL/L (ref 135–145)
SODIUM SERPL-SCNC: 135 MMOL/L (ref 135–145)
SOURCE SOURCE: NORMAL
SPECIMEN DRAWN FROM PATIENT: ABNORMAL
TEG ALGORITHM TGALG: ABNORMAL
TEG ALGORITHM TGALG: ABNORMAL
TIDAL VOLUME IVT: 450 ML
TSH SERPL DL<=0.005 MIU/L-ACNC: 2.37 UIU/ML (ref 0.38–5.33)
UNIT STATUS USTAT: NORMAL
WBC # BLD AUTO: 11.6 K/UL (ref 4.8–10.8)
WBC # BLD AUTO: 15.2 K/UL (ref 4.8–10.8)
WBC # BLD AUTO: 29 K/UL (ref 4.8–10.8)
WBC # BLD AUTO: 7.8 K/UL (ref 4.8–10.8)

## 2022-01-01 PROCEDURE — 30233N1 TRANSFUSION OF NONAUTOLOGOUS RED BLOOD CELLS INTO PERIPHERAL VEIN, PERCUTANEOUS APPROACH: ICD-10-PCS | Performed by: EMERGENCY MEDICINE

## 2022-01-01 PROCEDURE — 83605 ASSAY OF LACTIC ACID: CPT

## 2022-01-01 PROCEDURE — 30233K1 TRANSFUSION OF NONAUTOLOGOUS FROZEN PLASMA INTO PERIPHERAL VEIN, PERCUTANEOUS APPROACH: ICD-10-PCS | Performed by: EMERGENCY MEDICINE

## 2022-01-01 PROCEDURE — 86850 RBC ANTIBODY SCREEN: CPT

## 2022-01-01 PROCEDURE — 36620 INSERTION CATHETER ARTERY: CPT | Performed by: EMERGENCY MEDICINE

## 2022-01-01 PROCEDURE — 86923 COMPATIBILITY TEST ELECTRIC: CPT

## 2022-01-01 PROCEDURE — 80048 BASIC METABOLIC PNL TOTAL CA: CPT

## 2022-01-01 PROCEDURE — 03HY32Z INSERTION OF MONITORING DEVICE INTO UPPER ARTERY, PERCUTANEOUS APPROACH: ICD-10-PCS | Performed by: EMERGENCY MEDICINE

## 2022-01-01 PROCEDURE — 83735 ASSAY OF MAGNESIUM: CPT

## 2022-01-01 PROCEDURE — 80053 COMPREHEN METABOLIC PANEL: CPT

## 2022-01-01 PROCEDURE — 700111 HCHG RX REV CODE 636 W/ 250 OVERRIDE (IP): Performed by: EMERGENCY MEDICINE

## 2022-01-01 PROCEDURE — 700105 HCHG RX REV CODE 258: Performed by: INTERNAL MEDICINE

## 2022-01-01 PROCEDURE — 87426 SARSCOV CORONAVIRUS AG IA: CPT | Performed by: INTERNAL MEDICINE

## 2022-01-01 PROCEDURE — 36556 INSERT NON-TUNNEL CV CATH: CPT | Mod: RT,59 | Performed by: NURSE PRACTITIONER

## 2022-01-01 PROCEDURE — C9113 INJ PANTOPRAZOLE SODIUM, VIA: HCPCS | Performed by: EMERGENCY MEDICINE

## 2022-01-01 PROCEDURE — 700111 HCHG RX REV CODE 636 W/ 250 OVERRIDE (IP): Performed by: INTERNAL MEDICINE

## 2022-01-01 PROCEDURE — 99292 CRITICAL CARE ADDL 30 MIN: CPT | Mod: 25 | Performed by: INTERNAL MEDICINE

## 2022-01-01 PROCEDURE — 99291 CRITICAL CARE FIRST HOUR: CPT | Performed by: EMERGENCY MEDICINE

## 2022-01-01 PROCEDURE — 94002 VENT MGMT INPAT INIT DAY: CPT

## 2022-01-01 PROCEDURE — 37799 UNLISTED PX VASCULAR SURGERY: CPT

## 2022-01-01 PROCEDURE — 85027 COMPLETE CBC AUTOMATED: CPT

## 2022-01-01 PROCEDURE — 36620 INSERTION CATHETER ARTERY: CPT

## 2022-01-01 PROCEDURE — 36600 WITHDRAWAL OF ARTERIAL BLOOD: CPT

## 2022-01-01 PROCEDURE — 85610 PROTHROMBIN TIME: CPT

## 2022-01-01 PROCEDURE — 86901 BLOOD TYPING SEROLOGIC RH(D): CPT

## 2022-01-01 PROCEDURE — 84295 ASSAY OF SERUM SODIUM: CPT

## 2022-01-01 PROCEDURE — 02HV33Z INSERTION OF INFUSION DEVICE INTO SUPERIOR VENA CAVA, PERCUTANEOUS APPROACH: ICD-10-PCS | Performed by: EMERGENCY MEDICINE

## 2022-01-01 PROCEDURE — 700101 HCHG RX REV CODE 250: Performed by: EMERGENCY MEDICINE

## 2022-01-01 PROCEDURE — 84100 ASSAY OF PHOSPHORUS: CPT

## 2022-01-01 PROCEDURE — 82533 TOTAL CORTISOL: CPT

## 2022-01-01 PROCEDURE — P9016 RBC LEUKOCYTES REDUCED: HCPCS

## 2022-01-01 PROCEDURE — 160048 HCHG OR STATISTICAL LEVEL 1-5: Performed by: INTERNAL MEDICINE

## 2022-01-01 PROCEDURE — 71045 X-RAY EXAM CHEST 1 VIEW: CPT

## 2022-01-01 PROCEDURE — 85025 COMPLETE CBC W/AUTO DIFF WBC: CPT

## 2022-01-01 PROCEDURE — 92950 HEART/LUNG RESUSCITATION CPR: CPT

## 2022-01-01 PROCEDURE — 700117 HCHG RX CONTRAST REV CODE 255: Performed by: EMERGENCY MEDICINE

## 2022-01-01 PROCEDURE — 87040 BLOOD CULTURE FOR BACTERIA: CPT | Mod: 91

## 2022-01-01 PROCEDURE — 99291 CRITICAL CARE FIRST HOUR: CPT | Mod: 25 | Performed by: INTERNAL MEDICINE

## 2022-01-01 PROCEDURE — 80074 ACUTE HEPATITIS PANEL: CPT

## 2022-01-01 PROCEDURE — 5A1945Z RESPIRATORY VENTILATION, 24-96 CONSECUTIVE HOURS: ICD-10-PCS | Performed by: EMERGENCY MEDICINE

## 2022-01-01 PROCEDURE — 96375 TX/PRO/DX INJ NEW DRUG ADDON: CPT

## 2022-01-01 PROCEDURE — 85018 HEMOGLOBIN: CPT

## 2022-01-01 PROCEDURE — 160009 HCHG ANES TIME/MIN: Performed by: INTERNAL MEDICINE

## 2022-01-01 PROCEDURE — 85007 BL SMEAR W/DIFF WBC COUNT: CPT

## 2022-01-01 PROCEDURE — 99292 CRITICAL CARE ADDL 30 MIN: CPT | Performed by: INTERNAL MEDICINE

## 2022-01-01 PROCEDURE — 3E043XZ INTRODUCTION OF VASOPRESSOR INTO CENTRAL VEIN, PERCUTANEOUS APPROACH: ICD-10-PCS | Performed by: INTERNAL MEDICINE

## 2022-01-01 PROCEDURE — 36556 INSERT NON-TUNNEL CV CATH: CPT

## 2022-01-01 PROCEDURE — 700111 HCHG RX REV CODE 636 W/ 250 OVERRIDE (IP): Performed by: STUDENT IN AN ORGANIZED HEALTH CARE EDUCATION/TRAINING PROGRAM

## 2022-01-01 PROCEDURE — 86923 COMPATIBILITY TEST ELECTRIC: CPT | Mod: 91

## 2022-01-01 PROCEDURE — 94799 UNLISTED PULMONARY SVC/PX: CPT

## 2022-01-01 PROCEDURE — 160202 HCHG ENDO MINUTES - 1ST 30 MINS LEVEL 3: Performed by: INTERNAL MEDICINE

## 2022-01-01 PROCEDURE — 160207 HCHG ENDO MINUTES - EA ADDL 1 MIN LEVEL 3: Performed by: INTERNAL MEDICINE

## 2022-01-01 PROCEDURE — 87902 NFCT AGT GNTYP ALYS HEP C: CPT | Mod: 91

## 2022-01-01 PROCEDURE — 3E033XZ INTRODUCTION OF VASOPRESSOR INTO PERIPHERAL VEIN, PERCUTANEOUS APPROACH: ICD-10-PCS | Performed by: STUDENT IN AN ORGANIZED HEALTH CARE EDUCATION/TRAINING PROGRAM

## 2022-01-01 PROCEDURE — 700101 HCHG RX REV CODE 250: Performed by: INTERNAL MEDICINE

## 2022-01-01 PROCEDURE — 5A1D70Z PERFORMANCE OF URINARY FILTRATION, INTERMITTENT, LESS THAN 6 HOURS PER DAY: ICD-10-PCS | Performed by: INTERNAL MEDICINE

## 2022-01-01 PROCEDURE — P9047 ALBUMIN (HUMAN), 25%, 50ML: HCPCS | Performed by: STUDENT IN AN ORGANIZED HEALTH CARE EDUCATION/TRAINING PROGRAM

## 2022-01-01 PROCEDURE — P9017 PLASMA 1 DONOR FRZ W/IN 8 HR: HCPCS

## 2022-01-01 PROCEDURE — 96366 THER/PROPH/DIAG IV INF ADDON: CPT

## 2022-01-01 PROCEDURE — C9113 INJ PANTOPRAZOLE SODIUM, VIA: HCPCS | Performed by: INTERNAL MEDICINE

## 2022-01-01 PROCEDURE — 86900 BLOOD TYPING SEROLOGIC ABO: CPT

## 2022-01-01 PROCEDURE — 96368 THER/DIAG CONCURRENT INF: CPT

## 2022-01-01 PROCEDURE — 87205 SMEAR GRAM STAIN: CPT

## 2022-01-01 PROCEDURE — 85014 HEMATOCRIT: CPT

## 2022-01-01 PROCEDURE — 87522 HEPATITIS C REVRS TRNSCRPJ: CPT

## 2022-01-01 PROCEDURE — 85730 THROMBOPLASTIN TIME PARTIAL: CPT

## 2022-01-01 PROCEDURE — G0475 HIV COMBINATION ASSAY: HCPCS

## 2022-01-01 PROCEDURE — 99223 1ST HOSP IP/OBS HIGH 75: CPT | Performed by: INTERNAL MEDICINE

## 2022-01-01 PROCEDURE — 36415 COLL VENOUS BLD VENIPUNCTURE: CPT

## 2022-01-01 PROCEDURE — 74177 CT ABD & PELVIS W/CONTRAST: CPT

## 2022-01-01 PROCEDURE — P9016 RBC LEUKOCYTES REDUCED: HCPCS | Mod: 91

## 2022-01-01 PROCEDURE — 700101 HCHG RX REV CODE 250: Performed by: STUDENT IN AN ORGANIZED HEALTH CARE EDUCATION/TRAINING PROGRAM

## 2022-01-01 PROCEDURE — 93308 TTE F-UP OR LMTD: CPT | Mod: 26 | Performed by: INTERNAL MEDICINE

## 2022-01-01 PROCEDURE — 82803 BLOOD GASES ANY COMBINATION: CPT | Mod: 91

## 2022-01-01 PROCEDURE — 82330 ASSAY OF CALCIUM: CPT

## 2022-01-01 PROCEDURE — 93005 ELECTROCARDIOGRAM TRACING: CPT

## 2022-01-01 PROCEDURE — 770022 HCHG ROOM/CARE - ICU (200)

## 2022-01-01 PROCEDURE — 36430 TRANSFUSION BLD/BLD COMPNT: CPT

## 2022-01-01 PROCEDURE — 99291 CRITICAL CARE FIRST HOUR: CPT | Performed by: INTERNAL MEDICINE

## 2022-01-01 PROCEDURE — 700105 HCHG RX REV CODE 258: Performed by: EMERGENCY MEDICINE

## 2022-01-01 PROCEDURE — 99291 CRITICAL CARE FIRST HOUR: CPT

## 2022-01-01 PROCEDURE — 85576 BLOOD PLATELET AGGREGATION: CPT | Mod: 91

## 2022-01-01 PROCEDURE — 93308 TTE F-UP OR LMTD: CPT

## 2022-01-01 PROCEDURE — C1751 CATH, INF, PER/CENT/MIDLINE: HCPCS

## 2022-01-01 PROCEDURE — P9047 ALBUMIN (HUMAN), 25%, 50ML: HCPCS | Performed by: INTERNAL MEDICINE

## 2022-01-01 PROCEDURE — 85347 COAGULATION TIME ACTIVATED: CPT | Mod: 91

## 2022-01-01 PROCEDURE — 83690 ASSAY OF LIPASE: CPT

## 2022-01-01 PROCEDURE — 82962 GLUCOSE BLOOD TEST: CPT

## 2022-01-01 PROCEDURE — 94003 VENT MGMT INPAT SUBQ DAY: CPT

## 2022-01-01 PROCEDURE — 87900 PHENOTYPE INFECT AGENT DRUG: CPT

## 2022-01-01 PROCEDURE — 700105 HCHG RX REV CODE 258: Performed by: STUDENT IN AN ORGANIZED HEALTH CARE EDUCATION/TRAINING PROGRAM

## 2022-01-01 PROCEDURE — 84132 ASSAY OF SERUM POTASSIUM: CPT

## 2022-01-01 PROCEDURE — 36620 INSERTION CATHETER ARTERY: CPT | Mod: 59 | Performed by: NURSE PRACTITIONER

## 2022-01-01 PROCEDURE — 700102 HCHG RX REV CODE 250 W/ 637 OVERRIDE(OP): Performed by: INTERNAL MEDICINE

## 2022-01-01 PROCEDURE — 36556 INSERT NON-TUNNEL CV CATH: CPT | Mod: RT | Performed by: EMERGENCY MEDICINE

## 2022-01-01 PROCEDURE — 700102 HCHG RX REV CODE 250 W/ 637 OVERRIDE(OP): Performed by: STUDENT IN AN ORGANIZED HEALTH CARE EDUCATION/TRAINING PROGRAM

## 2022-01-01 PROCEDURE — 86706 HEP B SURFACE ANTIBODY: CPT

## 2022-01-01 PROCEDURE — 84443 ASSAY THYROID STIM HORMONE: CPT

## 2022-01-01 PROCEDURE — 96376 TX/PRO/DX INJ SAME DRUG ADON: CPT

## 2022-01-01 PROCEDURE — 96365 THER/PROPH/DIAG IV INF INIT: CPT

## 2022-01-01 PROCEDURE — 87070 CULTURE OTHR SPECIMN AEROBIC: CPT

## 2022-01-01 PROCEDURE — 06L38CZ OCCLUSION OF ESOPHAGEAL VEIN WITH EXTRALUMINAL DEVICE, VIA NATURAL OR ARTIFICIAL OPENING ENDOSCOPIC: ICD-10-PCS | Performed by: INTERNAL MEDICINE

## 2022-01-01 PROCEDURE — 90935 HEMODIALYSIS ONE EVALUATION: CPT

## 2022-01-01 PROCEDURE — 85384 FIBRINOGEN ACTIVITY: CPT | Mod: 91

## 2022-01-01 PROCEDURE — 94640 AIRWAY INHALATION TREATMENT: CPT

## 2022-01-01 RX ORDER — ONDANSETRON 2 MG/ML
INJECTION INTRAMUSCULAR; INTRAVENOUS PRN
Status: DISCONTINUED | OUTPATIENT
Start: 2022-01-01 | End: 2022-01-01 | Stop reason: SURG

## 2022-01-01 RX ORDER — NOREPINEPHRINE BITARTRATE 0.03 MG/ML
0-45 INJECTION, SOLUTION INTRAVENOUS CONTINUOUS
Status: DISCONTINUED | OUTPATIENT
Start: 2022-01-01 | End: 2022-01-01

## 2022-01-01 RX ORDER — ALBUMIN (HUMAN) 12.5 G/50ML
75 SOLUTION INTRAVENOUS ONCE
Status: COMPLETED | OUTPATIENT
Start: 2022-01-01 | End: 2022-01-01

## 2022-01-01 RX ORDER — ZOLPIDEM TARTRATE 10 MG/1
10 TABLET ORAL
Status: ON HOLD | COMMUNITY
End: 2022-01-01

## 2022-01-01 RX ORDER — HEPARIN SODIUM 1000 [USP'U]/ML
2800 INJECTION, SOLUTION INTRAVENOUS; SUBCUTANEOUS
Status: DISCONTINUED | OUTPATIENT
Start: 2022-01-01 | End: 2022-01-01

## 2022-01-01 RX ORDER — ROCURONIUM BROMIDE 10 MG/ML
INJECTION, SOLUTION INTRAVENOUS PRN
Status: DISCONTINUED | OUTPATIENT
Start: 2022-01-01 | End: 2022-01-01 | Stop reason: SURG

## 2022-01-01 RX ORDER — LABETALOL HYDROCHLORIDE 5 MG/ML
5 INJECTION, SOLUTION INTRAVENOUS
Status: DISCONTINUED | OUTPATIENT
Start: 2022-01-01 | End: 2022-01-01

## 2022-01-01 RX ORDER — DIPHENHYDRAMINE HYDROCHLORIDE 50 MG/ML
12.5 INJECTION INTRAMUSCULAR; INTRAVENOUS
Status: DISCONTINUED | OUTPATIENT
Start: 2022-01-01 | End: 2022-01-01

## 2022-01-01 RX ORDER — CALCIUM CHLORIDE 100 MG/ML
1 INJECTION INTRAVENOUS; INTRAVENTRICULAR ONCE
Status: DISCONTINUED | OUTPATIENT
Start: 2022-01-01 | End: 2022-01-01

## 2022-01-01 RX ORDER — PANTOPRAZOLE SODIUM 40 MG/10ML
40 INJECTION, POWDER, LYOPHILIZED, FOR SOLUTION INTRAVENOUS 2 TIMES DAILY
Status: DISCONTINUED | OUTPATIENT
Start: 2022-01-01 | End: 2022-01-01

## 2022-01-01 RX ORDER — GAUZE BANDAGE 2" X 2"
100 BANDAGE TOPICAL DAILY
Status: DISCONTINUED | OUTPATIENT
Start: 2022-01-01 | End: 2022-01-01

## 2022-01-01 RX ORDER — CALCIUM CHLORIDE 100 MG/ML
2 INJECTION INTRAVENOUS; INTRAVENTRICULAR ONCE
Status: DISCONTINUED | OUTPATIENT
Start: 2022-01-01 | End: 2022-01-01

## 2022-01-01 RX ORDER — MORPHINE SULFATE 4 MG/ML
4 INJECTION INTRAVENOUS ONCE
Status: COMPLETED | OUTPATIENT
Start: 2022-01-01 | End: 2022-01-01

## 2022-01-01 RX ORDER — CALCIUM GLUCONATE 20 MG/ML
1 INJECTION, SOLUTION INTRAVENOUS ONCE
Status: COMPLETED | OUTPATIENT
Start: 2022-01-01 | End: 2022-01-01

## 2022-01-01 RX ORDER — DEXTROSE MONOHYDRATE 25 G/50ML
INJECTION, SOLUTION INTRAVENOUS
Status: DISCONTINUED | OUTPATIENT
Start: 2022-01-01 | End: 2022-01-01 | Stop reason: SURG

## 2022-01-01 RX ORDER — LIDOCAINE HYDROCHLORIDE 20 MG/ML
INJECTION, SOLUTION EPIDURAL; INFILTRATION; INTRACAUDAL; PERINEURAL PRN
Status: DISCONTINUED | OUTPATIENT
Start: 2022-01-01 | End: 2022-01-01 | Stop reason: SURG

## 2022-01-01 RX ORDER — ESMOLOL HYDROCHLORIDE 10 MG/ML
INJECTION INTRAVENOUS PRN
Status: DISCONTINUED | OUTPATIENT
Start: 2022-01-01 | End: 2022-01-01 | Stop reason: SURG

## 2022-01-01 RX ORDER — ALBUMIN (HUMAN) 12.5 G/50ML
40 SOLUTION INTRAVENOUS DAILY
Status: DISCONTINUED | OUTPATIENT
Start: 2022-01-01 | End: 2022-01-01

## 2022-01-01 RX ORDER — HYDROMORPHONE HYDROCHLORIDE 1 MG/ML
.5-2 INJECTION, SOLUTION INTRAMUSCULAR; INTRAVENOUS; SUBCUTANEOUS
Status: DISCONTINUED | OUTPATIENT
Start: 2022-01-01 | End: 2022-01-01

## 2022-01-01 RX ORDER — SODIUM CHLORIDE, SODIUM LACTATE, POTASSIUM CHLORIDE, CALCIUM CHLORIDE 600; 310; 30; 20 MG/100ML; MG/100ML; MG/100ML; MG/100ML
1000 INJECTION, SOLUTION INTRAVENOUS ONCE
Status: COMPLETED | OUTPATIENT
Start: 2022-01-01 | End: 2022-01-01

## 2022-01-01 RX ORDER — IBUPROFEN 200 MG
200-400 TABLET ORAL EVERY 6 HOURS PRN
Status: ON HOLD | COMMUNITY
End: 2022-01-01

## 2022-01-01 RX ORDER — FOLIC ACID 1 MG/1
1 TABLET ORAL DAILY
Status: DISCONTINUED | OUTPATIENT
Start: 2022-01-01 | End: 2022-01-01

## 2022-01-01 RX ORDER — SODIUM BICARBONATE IN D5W 150/1000ML
PLASTIC BAG, INJECTION (ML) INTRAVENOUS CONTINUOUS
Status: DISCONTINUED | OUTPATIENT
Start: 2022-01-01 | End: 2022-01-01

## 2022-01-01 RX ORDER — HYDROMORPHONE HYDROCHLORIDE 1 MG/ML
0.4 INJECTION, SOLUTION INTRAMUSCULAR; INTRAVENOUS; SUBCUTANEOUS
Status: DISCONTINUED | OUTPATIENT
Start: 2022-01-01 | End: 2022-01-01

## 2022-01-01 RX ORDER — OXYCODONE HYDROCHLORIDE AND ACETAMINOPHEN 5; 325 MG/1; MG/1
1 TABLET ORAL
Status: DISCONTINUED | OUTPATIENT
Start: 2022-01-01 | End: 2022-01-01

## 2022-01-01 RX ORDER — AMOXICILLIN 250 MG
2 CAPSULE ORAL 2 TIMES DAILY
Status: DISCONTINUED | OUTPATIENT
Start: 2022-01-01 | End: 2022-01-01

## 2022-01-01 RX ORDER — SODIUM CHLORIDE, SODIUM LACTATE, POTASSIUM CHLORIDE, AND CALCIUM CHLORIDE .6; .31; .03; .02 G/100ML; G/100ML; G/100ML; G/100ML
500 INJECTION, SOLUTION INTRAVENOUS ONCE
Status: DISCONTINUED | OUTPATIENT
Start: 2022-01-01 | End: 2022-01-01

## 2022-01-01 RX ORDER — SODIUM CHLORIDE 9 MG/ML
500 INJECTION, SOLUTION INTRAVENOUS ONCE
Status: COMPLETED | OUTPATIENT
Start: 2022-01-01 | End: 2022-01-01

## 2022-01-01 RX ORDER — BISACODYL 10 MG
10 SUPPOSITORY, RECTAL RECTAL
Status: DISCONTINUED | OUTPATIENT
Start: 2022-01-01 | End: 2022-01-01

## 2022-01-01 RX ORDER — ETOMIDATE 2 MG/ML
INJECTION INTRAVENOUS PRN
Status: DISCONTINUED | OUTPATIENT
Start: 2022-01-01 | End: 2022-01-01 | Stop reason: SURG

## 2022-01-01 RX ORDER — SODIUM CHLORIDE, SODIUM GLUCONATE, SODIUM ACETATE, POTASSIUM CHLORIDE AND MAGNESIUM CHLORIDE 526; 502; 368; 37; 30 MG/100ML; MG/100ML; MG/100ML; MG/100ML; MG/100ML
INJECTION, SOLUTION INTRAVENOUS
Status: DISCONTINUED | OUTPATIENT
Start: 2022-01-01 | End: 2022-01-01 | Stop reason: SURG

## 2022-01-01 RX ORDER — MIDAZOLAM HYDROCHLORIDE 1 MG/ML
INJECTION INTRAMUSCULAR; INTRAVENOUS PRN
Status: DISCONTINUED | OUTPATIENT
Start: 2022-01-01 | End: 2022-01-01 | Stop reason: SURG

## 2022-01-01 RX ORDER — TRAZODONE HYDROCHLORIDE 50 MG/1
50 TABLET ORAL
Status: ON HOLD | COMMUNITY
End: 2022-01-01

## 2022-01-01 RX ORDER — MAGNESIUM SULFATE HEPTAHYDRATE 40 MG/ML
2 INJECTION, SOLUTION INTRAVENOUS ONCE
Status: COMPLETED | OUTPATIENT
Start: 2022-01-01 | End: 2022-01-01

## 2022-01-01 RX ORDER — POLYVINYL ALCOHOL 14 MG/ML
2 SOLUTION/ DROPS OPHTHALMIC EVERY 6 HOURS PRN
Status: DISCONTINUED | OUTPATIENT
Start: 2022-01-01 | End: 2022-01-01 | Stop reason: HOSPADM

## 2022-01-01 RX ORDER — OCTREOTIDE ACETATE 100 UG/ML
50 INJECTION, SOLUTION INTRAVENOUS; SUBCUTANEOUS ONCE
Status: COMPLETED | OUTPATIENT
Start: 2022-01-01 | End: 2022-01-01

## 2022-01-01 RX ORDER — MORPHINE SULFATE 10 MG/ML
5 INJECTION, SOLUTION INTRAMUSCULAR; INTRAVENOUS
Status: DISCONTINUED | OUTPATIENT
Start: 2022-01-01 | End: 2022-01-01 | Stop reason: HOSPADM

## 2022-01-01 RX ORDER — OXYCODONE HYDROCHLORIDE AND ACETAMINOPHEN 5; 325 MG/1; MG/1
2 TABLET ORAL
Status: DISCONTINUED | OUTPATIENT
Start: 2022-01-01 | End: 2022-01-01

## 2022-01-01 RX ORDER — MEPERIDINE HYDROCHLORIDE 50 MG/ML
6.25 INJECTION INTRAMUSCULAR; INTRAVENOUS; SUBCUTANEOUS
Status: DISCONTINUED | OUTPATIENT
Start: 2022-01-01 | End: 2022-01-01

## 2022-01-01 RX ORDER — ONDANSETRON 2 MG/ML
4 INJECTION INTRAMUSCULAR; INTRAVENOUS ONCE
Status: COMPLETED | OUTPATIENT
Start: 2022-01-01 | End: 2022-01-01

## 2022-01-01 RX ORDER — SUCCINYLCHOLINE CHLORIDE 20 MG/ML
INJECTION INTRAMUSCULAR; INTRAVENOUS PRN
Status: DISCONTINUED | OUTPATIENT
Start: 2022-01-01 | End: 2022-01-01 | Stop reason: SURG

## 2022-01-01 RX ORDER — HALOPERIDOL 5 MG/ML
1 INJECTION INTRAMUSCULAR
Status: DISCONTINUED | OUTPATIENT
Start: 2022-01-01 | End: 2022-01-01

## 2022-01-01 RX ORDER — MIDAZOLAM HYDROCHLORIDE 1 MG/ML
1-4 INJECTION INTRAMUSCULAR; INTRAVENOUS EVERY 4 HOURS PRN
Status: DISCONTINUED | OUTPATIENT
Start: 2022-01-01 | End: 2022-01-01

## 2022-01-01 RX ORDER — EPINEPHRINE HCL IN 0.9 % NACL 4MG/250ML
PLASTIC BAG, INJECTION (ML) INTRAVENOUS
Status: DISCONTINUED
Start: 2022-01-01 | End: 2022-01-01

## 2022-01-01 RX ORDER — DEXAMETHASONE SODIUM PHOSPHATE 4 MG/ML
INJECTION, SOLUTION INTRA-ARTICULAR; INTRALESIONAL; INTRAMUSCULAR; INTRAVENOUS; SOFT TISSUE PRN
Status: DISCONTINUED | OUTPATIENT
Start: 2022-01-01 | End: 2022-01-01 | Stop reason: SURG

## 2022-01-01 RX ORDER — ONDANSETRON 2 MG/ML
4 INJECTION INTRAMUSCULAR; INTRAVENOUS
Status: DISCONTINUED | OUTPATIENT
Start: 2022-01-01 | End: 2022-01-01

## 2022-01-01 RX ORDER — ATROPINE SULFATE 10 MG/ML
2 SOLUTION/ DROPS OPHTHALMIC EVERY 4 HOURS PRN
Status: DISCONTINUED | OUTPATIENT
Start: 2022-01-01 | End: 2022-01-01 | Stop reason: HOSPADM

## 2022-01-01 RX ORDER — HYDROMORPHONE HYDROCHLORIDE 1 MG/ML
0.1 INJECTION, SOLUTION INTRAMUSCULAR; INTRAVENOUS; SUBCUTANEOUS
Status: DISCONTINUED | OUTPATIENT
Start: 2022-01-01 | End: 2022-01-01

## 2022-01-01 RX ORDER — HYDROMORPHONE HYDROCHLORIDE 1 MG/ML
.5-1 INJECTION, SOLUTION INTRAMUSCULAR; INTRAVENOUS; SUBCUTANEOUS
Status: DISCONTINUED | OUTPATIENT
Start: 2022-01-01 | End: 2022-01-01

## 2022-01-01 RX ORDER — ALBUMIN (HUMAN) 12.5 G/50ML
25 SOLUTION INTRAVENOUS ONCE
Status: COMPLETED | OUTPATIENT
Start: 2022-01-01 | End: 2022-01-01

## 2022-01-01 RX ORDER — METOCLOPRAMIDE HYDROCHLORIDE 5 MG/ML
5 INJECTION INTRAMUSCULAR; INTRAVENOUS EVERY 6 HOURS
Status: DISCONTINUED | OUTPATIENT
Start: 2022-01-01 | End: 2022-01-01

## 2022-01-01 RX ORDER — HYDROMORPHONE HYDROCHLORIDE 1 MG/ML
0.2 INJECTION, SOLUTION INTRAMUSCULAR; INTRAVENOUS; SUBCUTANEOUS
Status: DISCONTINUED | OUTPATIENT
Start: 2022-01-01 | End: 2022-01-01

## 2022-01-01 RX ORDER — MORPHINE SULFATE 10 MG/ML
10 INJECTION, SOLUTION INTRAMUSCULAR; INTRAVENOUS
Status: DISCONTINUED | OUTPATIENT
Start: 2022-01-01 | End: 2022-01-01 | Stop reason: HOSPADM

## 2022-01-01 RX ORDER — LORAZEPAM 2 MG/ML
5-10 INJECTION INTRAMUSCULAR
Status: DISCONTINUED | OUTPATIENT
Start: 2022-01-01 | End: 2022-01-01 | Stop reason: HOSPADM

## 2022-01-01 RX ORDER — ALBUMIN (HUMAN) 12.5 G/50ML
50 SOLUTION INTRAVENOUS ONCE
Status: DISCONTINUED | OUTPATIENT
Start: 2022-01-01 | End: 2022-01-01

## 2022-01-01 RX ORDER — CALCIUM CHLORIDE 100 MG/ML
INJECTION INTRAVENOUS; INTRAVENTRICULAR PRN
Status: DISCONTINUED | OUTPATIENT
Start: 2022-01-01 | End: 2022-01-01 | Stop reason: SURG

## 2022-01-01 RX ORDER — LINEZOLID 2 MG/ML
600 INJECTION, SOLUTION INTRAVENOUS EVERY 12 HOURS
Status: DISCONTINUED | OUTPATIENT
Start: 2022-01-01 | End: 2022-01-01

## 2022-01-01 RX ORDER — SODIUM CHLORIDE, SODIUM LACTATE, POTASSIUM CHLORIDE, AND CALCIUM CHLORIDE .6; .31; .03; .02 G/100ML; G/100ML; G/100ML; G/100ML
1000 INJECTION, SOLUTION INTRAVENOUS ONCE
Status: COMPLETED | OUTPATIENT
Start: 2022-01-01 | End: 2022-01-01

## 2022-01-01 RX ORDER — HYDROMORPHONE HYDROCHLORIDE 2 MG/ML
1.5-2 INJECTION, SOLUTION INTRAMUSCULAR; INTRAVENOUS; SUBCUTANEOUS
Status: DISCONTINUED | OUTPATIENT
Start: 2022-01-01 | End: 2022-01-01

## 2022-01-01 RX ORDER — HYDRALAZINE HYDROCHLORIDE 20 MG/ML
5 INJECTION INTRAMUSCULAR; INTRAVENOUS
Status: DISCONTINUED | OUTPATIENT
Start: 2022-01-01 | End: 2022-01-01

## 2022-01-01 RX ORDER — SPIRONOLACTONE 25 MG/1
25 TABLET ORAL DAILY
Status: ON HOLD | COMMUNITY
End: 2022-01-01

## 2022-01-01 RX ORDER — MIDAZOLAM HYDROCHLORIDE 1 MG/ML
INJECTION INTRAMUSCULAR; INTRAVENOUS
Status: ACTIVE
Start: 2022-01-01 | End: 2022-01-01

## 2022-01-01 RX ORDER — FAMOTIDINE 20 MG/1
20 TABLET, FILM COATED ORAL EVERY 12 HOURS
Status: DISCONTINUED | OUTPATIENT
Start: 2022-01-01 | End: 2022-01-01

## 2022-01-01 RX ORDER — METOCLOPRAMIDE HYDROCHLORIDE 5 MG/ML
10 INJECTION INTRAMUSCULAR; INTRAVENOUS EVERY 6 HOURS
Status: DISCONTINUED | OUTPATIENT
Start: 2022-01-01 | End: 2022-01-01

## 2022-01-01 RX ORDER — POLYETHYLENE GLYCOL 3350 17 G/17G
1 POWDER, FOR SOLUTION ORAL
Status: DISCONTINUED | OUTPATIENT
Start: 2022-01-01 | End: 2022-01-01

## 2022-01-01 RX ADMIN — ALBUMIN (HUMAN) 40 G: 12.5 SOLUTION INTRAVENOUS at 04:15

## 2022-01-01 RX ADMIN — CALCIUM CHLORIDE 0.5 G: 100 INJECTION INTRAVENOUS; INTRAVENTRICULAR at 09:33

## 2022-01-01 RX ADMIN — LIDOCAINE HYDROCHLORIDE 100 MG: 20 INJECTION, SOLUTION EPIDURAL; INFILTRATION; INTRACAUDAL at 09:18

## 2022-01-01 RX ADMIN — NOREPINEPHRINE BITARTRATE 30 MCG/MIN: 1 INJECTION, SOLUTION, CONCENTRATE INTRAVENOUS at 12:00

## 2022-01-01 RX ADMIN — PROPOFOL 75 MCG/KG/MIN: 10 INJECTION, EMULSION INTRAVENOUS at 19:45

## 2022-01-01 RX ADMIN — NOREPINEPHRINE BITARTRATE 35 MCG/MIN: 1 INJECTION, SOLUTION, CONCENTRATE INTRAVENOUS at 21:15

## 2022-01-01 RX ADMIN — ALBUTEROL SULFATE 10 MG: 2.5 SOLUTION RESPIRATORY (INHALATION) at 16:13

## 2022-01-01 RX ADMIN — NOREPINEPHRINE BITARTRATE 35 MCG/MIN: 1 INJECTION, SOLUTION, CONCENTRATE INTRAVENOUS at 01:40

## 2022-01-01 RX ADMIN — THIAMINE HYDROCHLORIDE: 100 INJECTION, SOLUTION INTRAMUSCULAR; INTRAVENOUS at 23:42

## 2022-01-01 RX ADMIN — SODIUM CHLORIDE, SODIUM GLUCONATE, SODIUM ACETATE, POTASSIUM CHLORIDE AND MAGNESIUM CHLORIDE: 526; 502; 368; 37; 30 INJECTION, SOLUTION INTRAVENOUS at 09:52

## 2022-01-01 RX ADMIN — PHENYLEPHRINE HYDROCHLORIDE 100 MCG/MIN: 10 INJECTION INTRAVENOUS at 17:49

## 2022-01-01 RX ADMIN — METOCLOPRAMIDE 5 MG: 5 INJECTION, SOLUTION INTRAMUSCULAR; INTRAVENOUS at 17:28

## 2022-01-01 RX ADMIN — PHENYLEPHRINE HYDROCHLORIDE 100 MCG/MIN: 10 INJECTION INTRAVENOUS at 23:43

## 2022-01-01 RX ADMIN — SODIUM BICARBONATE 100 MEQ: 84 INJECTION, SOLUTION INTRAVENOUS at 17:00

## 2022-01-01 RX ADMIN — SODIUM CHLORIDE 8 MG/HR: 9 INJECTION, SOLUTION INTRAVENOUS at 04:26

## 2022-01-01 RX ADMIN — NOREPINEPHRINE BITARTRATE 35 MCG/MIN: 1 INJECTION, SOLUTION, CONCENTRATE INTRAVENOUS at 17:07

## 2022-01-01 RX ADMIN — SODIUM BICARBONATE 50 MEQ: 84 INJECTION, SOLUTION INTRAVENOUS at 11:03

## 2022-01-01 RX ADMIN — METOCLOPRAMIDE 10 MG: 5 INJECTION, SOLUTION INTRAMUSCULAR; INTRAVENOUS at 23:42

## 2022-01-01 RX ADMIN — INSULIN HUMAN 5 UNITS: 100 INJECTION, SOLUTION PARENTERAL at 05:09

## 2022-01-01 RX ADMIN — INSULIN HUMAN 5 UNITS: 100 INJECTION, SOLUTION PARENTERAL at 17:13

## 2022-01-01 RX ADMIN — MIDAZOLAM HYDROCHLORIDE 2 MG/HR: 5 INJECTION, SOLUTION INTRAMUSCULAR; INTRAVENOUS at 22:33

## 2022-01-01 RX ADMIN — PROPOFOL 30 MCG/KG/MIN: 10 INJECTION, EMULSION INTRAVENOUS at 17:02

## 2022-01-01 RX ADMIN — SODIUM CHLORIDE, POTASSIUM CHLORIDE, SODIUM LACTATE AND CALCIUM CHLORIDE 1000 ML: 600; 310; 30; 20 INJECTION, SOLUTION INTRAVENOUS at 16:31

## 2022-01-01 RX ADMIN — VASOPRESSIN 0.03 UNITS/MIN: 20 INJECTION PARENTERAL at 14:34

## 2022-01-01 RX ADMIN — CALCIUM CHLORIDE 2000 MG: 100 INJECTION, SOLUTION INTRAVENOUS at 18:49

## 2022-01-01 RX ADMIN — PANTOPRAZOLE SODIUM 40 MG: 40 INJECTION, POWDER, LYOPHILIZED, FOR SOLUTION INTRAVENOUS at 05:55

## 2022-01-01 RX ADMIN — SODIUM CHLORIDE, SODIUM GLUCONATE, SODIUM ACETATE, POTASSIUM CHLORIDE AND MAGNESIUM CHLORIDE: 526; 502; 368; 37; 30 INJECTION, SOLUTION INTRAVENOUS at 09:10

## 2022-01-01 RX ADMIN — ESMOLOL HYDROCHLORIDE 50 MG: 100 INJECTION, SOLUTION INTRAVENOUS at 10:19

## 2022-01-01 RX ADMIN — NOREPINEPHRINE BITARTRATE 5 MCG/MIN: 1 INJECTION, SOLUTION, CONCENTRATE INTRAVENOUS at 01:14

## 2022-01-01 RX ADMIN — ETOMIDATE 20 MG: 2 INJECTION INTRAVENOUS at 09:18

## 2022-01-01 RX ADMIN — ROCURONIUM BROMIDE 100 MG: 10 INJECTION, SOLUTION INTRAVENOUS at 10:15

## 2022-01-01 RX ADMIN — MORPHINE SULFATE 4 MG: 4 INJECTION INTRAVENOUS at 19:05

## 2022-01-01 RX ADMIN — HYDROCORTISONE SODIUM SUCCINATE 50 MG: 100 INJECTION, POWDER, FOR SOLUTION INTRAMUSCULAR; INTRAVENOUS at 17:25

## 2022-01-01 RX ADMIN — NOREPINEPHRINE BITARTRATE 45 MCG/MIN: 1 INJECTION, SOLUTION, CONCENTRATE INTRAVENOUS at 05:33

## 2022-01-01 RX ADMIN — PANTOPRAZOLE SODIUM 40 MG: 40 INJECTION, POWDER, LYOPHILIZED, FOR SOLUTION INTRAVENOUS at 17:28

## 2022-01-01 RX ADMIN — OCTREOTIDE ACETATE 50 MCG: 100 INJECTION, SOLUTION INTRAVENOUS; SUBCUTANEOUS at 19:47

## 2022-01-01 RX ADMIN — PHENYLEPHRINE HYDROCHLORIDE 250 MCG/MIN: 10 INJECTION INTRAVENOUS at 20:43

## 2022-01-01 RX ADMIN — Medication: at 16:00

## 2022-01-01 RX ADMIN — HYDROCORTISONE SODIUM SUCCINATE 50 MG: 100 INJECTION, POWDER, FOR SOLUTION INTRAMUSCULAR; INTRAVENOUS at 00:16

## 2022-01-01 RX ADMIN — PROPOFOL 75 MCG/KG/MIN: 10 INJECTION, EMULSION INTRAVENOUS at 21:29

## 2022-01-01 RX ADMIN — MORPHINE SULFATE 2 MG: 4 INJECTION INTRAVENOUS at 17:39

## 2022-01-01 RX ADMIN — FENTANYL CITRATE 50 MCG: 50 INJECTION, SOLUTION INTRAMUSCULAR; INTRAVENOUS at 03:54

## 2022-01-01 RX ADMIN — MIDAZOLAM HYDROCHLORIDE 4 MG: 1 INJECTION, SOLUTION INTRAMUSCULAR; INTRAVENOUS at 18:24

## 2022-01-01 RX ADMIN — MIDAZOLAM HYDROCHLORIDE 2 MG: 1 INJECTION, SOLUTION INTRAMUSCULAR; INTRAVENOUS at 09:16

## 2022-01-01 RX ADMIN — CEFTRIAXONE SODIUM 1 G: 10 INJECTION, POWDER, FOR SOLUTION INTRAVENOUS at 19:44

## 2022-01-01 RX ADMIN — PHENYLEPHRINE HYDROCHLORIDE 300 MCG/MIN: 10 INJECTION INTRAVENOUS at 03:09

## 2022-01-01 RX ADMIN — METOCLOPRAMIDE 10 MG: 5 INJECTION, SOLUTION INTRAMUSCULAR; INTRAVENOUS at 06:05

## 2022-01-01 RX ADMIN — SODIUM BICARBONATE: 84 INJECTION, SOLUTION INTRAVENOUS at 23:28

## 2022-01-01 RX ADMIN — ALBUMIN (HUMAN) 25 G: 5 SOLUTION INTRAVENOUS at 13:00

## 2022-01-01 RX ADMIN — Medication 0.5 MG/HR: at 06:48

## 2022-01-01 RX ADMIN — METOCLOPRAMIDE 5 MG: 5 INJECTION, SOLUTION INTRAMUSCULAR; INTRAVENOUS at 05:55

## 2022-01-01 RX ADMIN — MAGNESIUM SULFATE HEPTAHYDRATE 2 G: 40 INJECTION, SOLUTION INTRAVENOUS at 17:16

## 2022-01-01 RX ADMIN — SODIUM CHLORIDE 80 MG: 9 INJECTION, SOLUTION INTRAVENOUS at 16:50

## 2022-01-01 RX ADMIN — HYDROCORTISONE SODIUM SUCCINATE 50 MG: 100 INJECTION, POWDER, FOR SOLUTION INTRAMUSCULAR; INTRAVENOUS at 05:55

## 2022-01-01 RX ADMIN — PHYTONADIONE 10 MG: 10 INJECTION, EMULSION INTRAMUSCULAR; INTRAVENOUS; SUBCUTANEOUS at 21:15

## 2022-01-01 RX ADMIN — PROPOFOL 50 MG: 10 INJECTION, EMULSION INTRAVENOUS at 10:18

## 2022-01-01 RX ADMIN — SUGAMMADEX 200 MG: 100 INJECTION, SOLUTION INTRAVENOUS at 10:28

## 2022-01-01 RX ADMIN — SUCCINYLCHOLINE CHLORIDE 160 MG: 20 INJECTION, SOLUTION INTRAMUSCULAR; INTRAVENOUS; PARENTERAL at 09:18

## 2022-01-01 RX ADMIN — CALCIUM GLUCONATE 1 G: 20 INJECTION, SOLUTION INTRAVENOUS at 05:13

## 2022-01-01 RX ADMIN — DEXTROSE MONOHYDRATE 25 G: 100 INJECTION, SOLUTION INTRAVENOUS at 04:43

## 2022-01-01 RX ADMIN — VASOPRESSIN 0.03 UNITS/MIN: 20 INJECTION PARENTERAL at 05:56

## 2022-01-01 RX ADMIN — HEPARIN SODIUM 2800 UNITS: 1000 INJECTION, SOLUTION INTRAVENOUS; SUBCUTANEOUS at 20:28

## 2022-01-01 RX ADMIN — SODIUM BICARBONATE: 84 INJECTION, SOLUTION INTRAVENOUS at 09:46

## 2022-01-01 RX ADMIN — DEXTROSE MONOHYDRATE 25 G: 100 INJECTION, SOLUTION INTRAVENOUS at 17:16

## 2022-01-01 RX ADMIN — PROPOFOL 40 MCG/KG/MIN: 10 INJECTION, EMULSION INTRAVENOUS at 16:00

## 2022-01-01 RX ADMIN — CALCIUM CHLORIDE 1 G: 100 INJECTION, SOLUTION INTRAVENOUS at 11:03

## 2022-01-01 RX ADMIN — ALBUMIN (HUMAN) 50 G: 5 SOLUTION INTRAVENOUS at 15:15

## 2022-01-01 RX ADMIN — PROPOFOL 100 MG: 10 INJECTION, EMULSION INTRAVENOUS at 10:15

## 2022-01-01 RX ADMIN — ALBUMIN (HUMAN) 75 G: 12.5 SOLUTION INTRAVENOUS at 17:00

## 2022-01-01 RX ADMIN — PROPOFOL 30 MCG/KG/MIN: 10 INJECTION, EMULSION INTRAVENOUS at 11:32

## 2022-01-01 RX ADMIN — IOHEXOL 80 ML: 350 INJECTION, SOLUTION INTRAVENOUS at 18:17

## 2022-01-01 RX ADMIN — DEXAMETHASONE SODIUM PHOSPHATE 4 MG: 4 INJECTION, SOLUTION INTRA-ARTICULAR; INTRALESIONAL; INTRAMUSCULAR; INTRAVENOUS; SOFT TISSUE at 09:40

## 2022-01-01 RX ADMIN — LORAZEPAM 10 MG: 2 INJECTION INTRAMUSCULAR; INTRAVENOUS at 11:23

## 2022-01-01 RX ADMIN — OCTREOTIDE ACETATE 50 MCG/HR: 200 INJECTION, SOLUTION INTRAVENOUS; SUBCUTANEOUS at 22:38

## 2022-01-01 RX ADMIN — SODIUM CHLORIDE 500 ML: 9 INJECTION, SOLUTION INTRAVENOUS at 22:45

## 2022-01-01 RX ADMIN — METOCLOPRAMIDE 10 MG: 5 INJECTION, SOLUTION INTRAMUSCULAR; INTRAVENOUS at 11:48

## 2022-01-01 RX ADMIN — METOCLOPRAMIDE 5 MG: 5 INJECTION, SOLUTION INTRAMUSCULAR; INTRAVENOUS at 00:16

## 2022-01-01 RX ADMIN — SODIUM CHLORIDE, POTASSIUM CHLORIDE, SODIUM LACTATE AND CALCIUM CHLORIDE 1000 ML: 600; 310; 30; 20 INJECTION, SOLUTION INTRAVENOUS at 00:32

## 2022-01-01 RX ADMIN — METOCLOPRAMIDE 10 MG: 5 INJECTION, SOLUTION INTRAMUSCULAR; INTRAVENOUS at 21:18

## 2022-01-01 RX ADMIN — ONDANSETRON 4 MG: 2 INJECTION INTRAMUSCULAR; INTRAVENOUS at 17:39

## 2022-01-01 RX ADMIN — DEXTROSE MONOHYDRATE: 25 INJECTION, SOLUTION INTRAVENOUS at 10:40

## 2022-01-01 RX ADMIN — SODIUM CHLORIDE 8 MG/HR: 9 INJECTION, SOLUTION INTRAVENOUS at 17:16

## 2022-01-01 RX ADMIN — Medication 2500 MCG: at 11:41

## 2022-01-01 RX ADMIN — CEFTRIAXONE SODIUM 1 G: 10 INJECTION, POWDER, FOR SOLUTION INTRAVENOUS at 17:27

## 2022-01-01 RX ADMIN — PHENYLEPHRINE HYDROCHLORIDE 300 MCG/MIN: 10 INJECTION INTRAVENOUS at 05:33

## 2022-01-01 RX ADMIN — ONDANSETRON 4 MG: 2 INJECTION INTRAMUSCULAR; INTRAVENOUS at 09:40

## 2022-01-01 RX ADMIN — CALCIUM CHLORIDE 1000 MG: 100 INJECTION, SOLUTION INTRAVENOUS at 12:45

## 2022-01-01 RX ADMIN — CALCIUM CHLORIDE 0.5 G: 100 INJECTION INTRAVENOUS; INTRAVENTRICULAR at 09:29

## 2022-01-01 RX ADMIN — Medication 200 MCG/HR: at 22:05

## 2022-01-01 RX ADMIN — NOREPINEPHRINE BITARTRATE 13 MCG/MIN: 1 INJECTION, SOLUTION, CONCENTRATE INTRAVENOUS at 09:10

## 2022-01-01 ASSESSMENT — ENCOUNTER SYMPTOMS
ABDOMINAL PAIN: 1
SHORTNESS OF BREATH: 0
BRUISES/BLEEDS EASILY: 1
SORE THROAT: 0

## 2022-01-01 ASSESSMENT — PATIENT HEALTH QUESTIONNAIRE - PHQ9
SUM OF ALL RESPONSES TO PHQ9 QUESTIONS 1 AND 2: 0
2. FEELING DOWN, DEPRESSED, IRRITABLE, OR HOPELESS: NOT AT ALL
2. FEELING DOWN, DEPRESSED, IRRITABLE, OR HOPELESS: NOT AT ALL
1. LITTLE INTEREST OR PLEASURE IN DOING THINGS: NOT AT ALL
SUM OF ALL RESPONSES TO PHQ9 QUESTIONS 1 AND 2: 0
1. LITTLE INTEREST OR PLEASURE IN DOING THINGS: NOT AT ALL

## 2022-01-01 ASSESSMENT — PAIN DESCRIPTION - PAIN TYPE
TYPE: ACUTE PAIN

## 2022-01-01 ASSESSMENT — FIBROSIS 4 INDEX: FIB4 SCORE: 9.02

## 2022-03-12 PROBLEM — D68.9 COAGULOPATHY (HCC): Status: ACTIVE | Noted: 2022-01-01

## 2022-03-12 PROBLEM — B19.20 HEPATITIS C TEST POSITIVE: Status: ACTIVE | Noted: 2022-01-01

## 2022-03-12 PROBLEM — K92.2 GI BLEED: Status: ACTIVE | Noted: 2022-01-01

## 2022-03-12 PROBLEM — K70.31 ALCOHOLIC CIRRHOSIS OF LIVER WITH ASCITES (HCC): Status: ACTIVE | Noted: 2022-01-01

## 2022-03-12 PROBLEM — D62 ACUTE BLOOD LOSS ANEMIA: Status: ACTIVE | Noted: 2022-01-01

## 2022-03-13 PROBLEM — I85.01 ESOPHAGEAL VARICES WITH BLEEDING (HCC): Status: ACTIVE | Noted: 2022-01-01

## 2022-03-13 PROBLEM — J96.01 ACUTE RESPIRATORY FAILURE WITH HYPOXIA (HCC): Status: ACTIVE | Noted: 2022-01-01

## 2022-03-13 PROBLEM — E87.5 HYPERKALEMIA: Status: ACTIVE | Noted: 2022-01-01

## 2022-03-13 PROBLEM — R57.8 HEMORRHAGIC SHOCK (HCC): Status: ACTIVE | Noted: 2022-01-01

## 2022-03-13 PROBLEM — N17.9 AKI (ACUTE KIDNEY INJURY) (HCC): Status: ACTIVE | Noted: 2022-01-01

## 2022-03-13 NOTE — ANESTHESIA PREPROCEDURE EVALUATION
Case: [id number] Date/Time: 03/13/22 0830    Procedure: GASTROSCOPY    Location: [facility] OR 08 / SURGERY [facility]    Surgeons: Luis Brooks D.O.          Relevant Problems      (positive) Alcoholic cirrhosis of liver with ascites (HCC)       Physical Exam    Airway   Mallampati: III  TM distance: >3 FB  Neck ROM: full       Cardiovascular - normal exam  Rhythm: regular  Rate: abnormal  (-) murmur     Dental       Very poor dentition  Facial Hair   Pulmonary - normal exam  Breath sounds clear to auscultation     Abdominal    Neurological - normal exam                 Anesthesia Plan    ASA 4       Plan - general       Airway plan will be ETT          Induction: intravenous      Pertinent diagnostic labs and testing reviewed    Informed Consent:    Anesthetic plan and risks discussed with patient.    Use of blood products discussed with: patient whom consented to blood products.

## 2022-03-13 NOTE — CONSULTS
DATE OF SERVICE:  03/13/2022     REQUESTING PHYSICIAN:  Willard Domínguez Jr.,      REASON FOR CONSULTATION:  Severe hyperkalemia and acute kidney injury,   assessing the need for urgent dialysis.     Unfortunately, the patient is intubated, unresponsive, unable to provide any   history.  Most of the story was through reviewing the record and discussing   the case with Dr. Domínguez.     HISTORY OF PRESENT ILLNESS:  Briefly, the patient is a 46-year-old gentleman   with a past medical history significant for chronic alcohol use, history of   hepatitis C, was recently diagnosed with liver cirrhosis and upper GI bleed.    Earlier today, the patient underwent endoscopy and esophageal varices   ligation.  After the procedure, the patient had to be intubated, sedated.    Also, was found to be in acute kidney injury with a creatinine up to 2.34 and   his potassium is 7.2.     The patient did receive abdominal CT scan with IV contrast yesterday.  I did   review the CT scan images, showed severe ascites, but no hydronephrosis.     PAST MEDICAL HISTORY:  Unobtainable.  Please refer to the chart.     SOCIAL HISTORY:  Unobtainable.  Please refer to the chart.     FAMILY HISTORY:  Unobtainable.  Please refer to the chart.     MEDICATIONS:  Unobtainable.  Please refer to the chart.     REVIEW OF SYSTEMS:  Unobtainable.  Please refer to the chart.     ALLERGIES:  Unobtainable.  Please refer to the chart.     PHYSICAL EXAMINATION:  GENERAL:  The patient is intubated, unresponsive.  He has endotracheal tube.  VITAL SIGNS:  Showed blood pressure of 102/46, heart rate was 140, and   respiratory rate was 24.  HEENT:  Normocephalic, atraumatic.  Sclerae are nonicteric.  Mucous membranes   dry.  NECK:  No lymphadenopathy, no JVD, no thyromegaly.  CHEST:  Normal.  LUNGS:  Coarse breath sounds.  HEART:  S1, S2.  ABDOMEN:  Distended, but soft, nontender.  No hepatosplenomegaly.  There is no   inguinal lymphadenopathy.  EXTREMITIES:  There is  trace lower extremity edema.  SKIN:  There are multiple tattoos.  NEUROLOGIC:  The patient is sedated.     LABORATORY DATA:  His recent labs from today were reviewed.  Again, I reviewed   the abdominal CT scan images myself, which showed no hydronephrosis.     ASSESSMENT:  1.  Acute kidney injury, the etiology probably multifactorial.  Most likely   the patient has contrast-induced nephropathy.  Also, possible ATN.  2.  Severe hyperkalemia secondary to renal failure.  3.  Liver cirrhosis.  4.  Anemia.  5.  Leukocytosis.  6.  Hypoalbuminemia.  7.  Overall volume overload.  However, the patient might be intravascularly   volume deplete secondary to third space.     PLAN:  1.  We will plan urgent dialysis to manage hyperkalemia and acute kidney   injury.  2.  Recommend IV fluid challenge to help with intravascular volume and   hypotension.  3.  Evaluate daily for the need of dialysis.  4.  Renal diet.  5.  Renal dose all medications.  6.  Prognosis is guarded.  7.  Multisystem organ failure.     Plan discussed in detail with Dr. Domínguez.        ______________________________  FADI NAJJAR, MD    FN/JAN    DD:  03/13/2022 16:02  DT:  03/13/2022 16:31    Job#:  [id number]

## 2022-03-13 NOTE — ASSESSMENT & PLAN NOTE
Due to combination of alcohol and hepatitis  Change ceftriaxone to Zosyn and linezolid  MELD 27  Discriminant function 35  Child class C

## 2022-03-13 NOTE — ED TRIAGE NOTES
BIB [facility] from home for hematemesis with about 500ml of clotted, dark brown emesis per EMS. Pt endorses RUQ pain and abdominal distention. Recent diagnosis of liver cirrhosis, unknown stage. Pt says he started getting dizzy last night. En route PIV established and 50 mcg fentanyl given. Pt changed into gown, placed on cardiac monitor. Chart up for ERP.

## 2022-03-13 NOTE — ED NOTES
Pharmacy Medication Reconciliation      ~Medication reconciliation updated and complete per patient, patient family at bedside & patient home pharmacy  ~Allergies have been verified   ~No oral ABX within the last 30 days  ~Patient home pharmacy:[address]

## 2022-03-13 NOTE — PROCEDURES
"Arterial Line Insertion    Date/Time: 3/13/2022 3:55 PM  Performed by: Quinn Washington M.D.  Authorized by: Quinn Washington M.D.   Consent: Verbal consent obtained.  Risks and benefits: risks, benefits and alternatives were discussed  Consent given by: parent  Patient understanding: patient states understanding of the procedure being performed  Patient consent: the patient's understanding of the procedure matches consent given  Procedure consent: procedure consent matches procedure scheduled  Relevant documents: relevant documents present and verified  Test results: test results available and properly labeled  Imaging studies: imaging studies available  Required items: required blood products, implants, devices, and special equipment available  Patient identity confirmed: anonymous protocol, patient vented/unresponsive  Time out: Immediately prior to procedure a \"time out\" was called to verify the correct patient, procedure, equipment, support staff and site/side marked as required.  Preparation: Patient was prepped and draped in the usual sterile fashion.  Indications: hemodynamic monitoring  Location: right axillary.  Anesthesia: local infiltration    Anesthesia:  Local Anesthetic: lidocaine 1% without epinephrine  Anesthetic total: 1 mL  Needle gauge: 20  Seldinger technique: Seldinger technique used  Number of attempts: 1  Post-procedure: line sutured and dressing applied  Post-procedure CMS: unchanged  Patient tolerance: patient tolerated the procedure well with no immediate complications      Wire removal: After insertion of the catheter via Seldinger technique the guidewire was removed intact and confirmed by the assistant in the room.        "

## 2022-03-13 NOTE — PROCEDURES
Central Line Insertion    Date/Time: 3/13/2022 2:09 PM  Performed by: GUILLE Ramey  Authorized by: GUILLE Ramey     Consent:     Consent obtained:  Emergent situation  Pre-procedure details:     Hand hygiene: Hand hygiene performed prior to insertion      Sterile barrier technique: All elements of maximal sterile technique followed      Skin preparation:  2% chlorhexidine    Skin preparation agent: Skin preparation agent completely dried prior to procedure    Procedure details:     Location:  R internal jugular    Patient position:  Flat    Procedural supplies:  Triple lumen    Catheter size:  7 Fr    Landmarks identified: yes      Ultrasound guidance: yes      Sterile ultrasound techniques: Sterile gel and sterile probe covers were used      Number of attempts:  1    Successful placement: yes    Post-procedure details:     Post-procedure:  Dressing applied and line sutured    Assessment:  Blood return through all ports, no pneumothorax on x-ray and placement verified by x-ray    Patient tolerance of procedure:  Tolerated well, no immediate complications  Comments:      Guidewire removed  Line OK to use      GUILLE Ramey

## 2022-03-13 NOTE — ASSESSMENT & PLAN NOTE
Suspect ATN due to shock  Monitor renal function and urine output  Avoid nephrotoxins and renal dose medications  Bicarbonate drip

## 2022-03-13 NOTE — ANESTHESIA POSTPROCEDURE EVALUATION
Patient: Fam Santana    Procedure Summary     Date: 03/13/22 Room / Location: [facility] 08 / SURGERY [facility]    Anesthesia Start: 0910 Anesthesia Stop: 1045    Procedure: GASTROSCOPY Diagnosis: (VARICES, ESOPHAGITIS)    Surgeons: Luis Brooks D.O. Responsible Provider: Bryan Chacko M.D.    Anesthesia Type: general ASA Status: 4          Final Anesthesia Type: general  Last vitals  BP   Blood Pressure: 102/46    Temp   36.7 °C (98 °F)    Pulse   (!) 128   Resp   15    SpO2   97 %      Anesthesia Post Evaluation    Patient location during evaluation: ICU  Patient participation: complete - patient cannot participate  Post-procedure mental status: intubated and sedated.    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 Blood sugar 56, given 1 amp D50. Normothermic.    PONV: none          No complications documented.     Nurse Pain Score: 9 (NPRS)

## 2022-03-13 NOTE — HOSPITAL COURSE
"\"46 y.o. male w/ history of cirrhosis who presented 3/12/2022 with hematemesis that started 30 minutes prior to arrival.  The patient was seen at [facility] in [place] this morning where he was told he has cirrhosis and was told to follow-up with a hepatologist on Monday.  He then went home and developed worsening RUQ abdominal pain (present for the last week) and hematemesis.  He endorses dark stool, but denies any recent nausea or vomiting, no BRBPR, no history of similar.  He says he stopped drinking EtOH about 9 months ago and stopped illicit drugs 'years ago.' \"  "

## 2022-03-13 NOTE — ANESTHESIA PROCEDURE NOTES
Airway    Date/Time: 3/13/2022 9:19 AM  Performed by: Bryan Chacko M.D.  Authorized by: Bryan Chacko M.D.     Location:  OR  Urgency:  Elective  Difficult Airway: No    Indications for Airway Management:  Anesthesia      Spontaneous Ventilation: absent    Sedation Level:  Deep  Preoxygenated: Yes    Patient Position:  Sniffing  Mask Difficulty Assessment:  0 - not attempted  Final Airway Type:  Endotracheal airway  Final Endotracheal Airway:  ETT  Cuffed: Yes    Technique Used for Successful ETT Placement:  Direct laryngoscopy    Insertion Site:  Oral  Blade Type:  Kat  Laryngoscope Blade/Videolaryngoscope Blade Size:  2  ETT Size (mm):  8.0  Measured from:  Teeth  ETT to Teeth (cm):  24  Placement Verified by: auscultation and capnometry    Cormack-Lehane Classification:  Grade IIa - partial view of glottis  Number of Attempts at Approach:  1

## 2022-03-13 NOTE — PROGRESS NOTES
"Critical Care Progress Note    Date of admission  3/12/2022    Chief Complaint  46 y.o. male admitted 3/12/2022 with GIB    Hospital Course  \"46 y.o. male w/ history of cirrhosis who presented 3/12/2022 with hematemesis that started 30 minutes prior to arrival.  The patient was seen at [facility] this morning where he was told he has cirrhosis and was told to follow-up with a hepatologist on Monday.  He then went home and developed worsening RUQ abdominal pain (present for the last week) and hematemesis.  He endorses dark stool, but denies any recent nausea or vomiting, no BRBPR, no history of similar.  He says he stopped drinking EtOH about 9 months ago and stopped illicit drugs 'years ago.' \"      Interval Problem Update  Reviewed last 24 hour events:   - Accidentally bolused octreotide overnight, now on levo   - Neuro: AOx4   - HR: 100s-120s   - SBP: 80s-110s, on NE at 13   - GI: NPO for scope   - UOP: 550 mL since admit   - Roque: no   - Tm: 37.1   - Lines: PIV   - PPx: GI PPI, DVT contraindicated   - RA   - CXR (personally reviewed and compared to prior): BB ATX   - Going to the OR for EGD    Updates: returned from OR on mechanical ventilation with transient improvement in hemodynamics following copious crystalloid and colloid resuscitation.  Unfortunately his hemodynamics again worsened requiring multiple vasopressors and continued volume placement.  His metabolic panel upon returning from the OR showed significant worsening of his renal function with severe hyperkalemia.  Nephrology has been consulted to assist with hemodialysis.    Review of Systems  Review of Systems   Unable to perform ROS: Critical illness        Vital Signs for last 24 hours   Temp:  [35.9 °C (96.7 °F)-37.1 °C (98.8 °F)] 37.1 °C (98.8 °F)  Pulse:  [] 129  Resp:  [15-36] 22  BP: ()/(28-83) 108/33  SpO2:  [90 %-100 %] 93 %    Hemodynamic parameters for last 24 hours       Respiratory Information for the last 24 hours   "     Physical Exam   Physical Exam  Vitals and nursing note reviewed.   Constitutional:       General: He is in acute distress.      Appearance: He is toxic-appearing.      Interventions: He is intubated.   HENT:      Head: Normocephalic and atraumatic.      Right Ear: External ear normal.      Left Ear: External ear normal.      Nose: Nose normal.      Mouth/Throat:      Comments: ET tube in place  Eyes:      Extraocular Movements: Extraocular movements intact.   Neck:      Comments: Right IJ central venous catheter  Cardiovascular:      Rate and Rhythm: Regular rhythm. Tachycardia present.   Pulmonary:      Effort: He is intubated.      Breath sounds: Rales present.   Abdominal:      General: There is distension.   Musculoskeletal:      Cervical back: Neck supple.      Right lower leg: Edema present.      Left lower leg: Edema present.   Skin:     General: Skin is warm and dry.      Capillary Refill: Capillary refill takes 2 to 3 seconds.      Comments: Multiple tattoos   Neurological:      Comments: Sedated and paralyzed.  Pre-op was alert and oriented with no focal neurologic deficits   Psychiatric:      Comments: Unable to assess.  Normal mood and behavior pre-op         Medications  Current Facility-Administered Medications   Medication Dose Route Frequency Provider Last Rate Last Admin   • norepinephrine (Levophed) 8 mg in 250 mL NS infusion (premix)  0-30 mcg/min Intravenous Continuous Daniel Paz M.D. 24.4 mL/hr at 03/13/22 0438 13 mcg/min at 03/13/22 0438   • fentaNYL (SUBLIMAZE) injection 50 mcg  50 mcg Intravenous Q2HRS PRN Daniel Paz M.D.   50 mcg at 03/13/22 0354   • pantoprazole (Protonix) 80 mg in  mL Infusion  8 mg/hr Intravenous Continuous Juliocesar Meehan M.D. 25 mL/hr at 03/13/22 0426 8 mg/hr at 03/13/22 0426   • [Held by provider] octreotide (SANDOSTATIN) 1,250 mcg in  mL Infusion  50 mcg/hr Intravenous Continuous Quinn Washington M.D. 10 mL/hr at 03/12/22 2238 50 mcg/hr at  03/12/22 2238   • cefTRIAXone (Rocephin) syringe 1 g  1 g Intravenous Q24HRS Quinn Washington M.D.   1 g at 03/12/22 1944   • Pharmacy Consult Request   Other PHARMACY TO DOSE Quinn Washington M.D.       • thiamine (Vitamin B-1) tablet 100 mg  100 mg Oral DAILY Quinn Washington M.D.        And   • folic acid (FOLVITE) tablet 1 mg  1 mg Oral DAILY Quinn Washington M.D.        And   • multivitamin (THERAGRAN) tablet 1 Tablet  1 Tablet Oral DAILY Quinn Washington M.D.       • metoclopramide (REGLAN) injection 10 mg  10 mg Intravenous Q6HRS Belinda Giron D.O.   10 mg at 03/13/22 0605       Fluids    Intake/Output Summary (Last 24 hours) at 3/13/2022 0716  Last data filed at 3/13/2022 0600  Gross per 24 hour   Intake 6645.93 ml   Output 550 ml   Net 6095.93 ml       Laboratory          Recent Labs     03/12/22 1626 03/13/22  0400   SODIUM 133* 134*   POTASSIUM 5.0 5.8*   CHLORIDE 106 110   CO2 17* 16*   BUN 47* 46*   CREATININE 1.75* 1.41*   MAGNESIUM  --  1.9   PHOSPHORUS  --  4.0   CALCIUM 7.8* 7.7*     Recent Labs     03/12/22 1626 03/13/22  0400   ALTSGPT 264* 388*   ASTSGOT 401* 646*   ALKPHOSPHAT 103* 95   TBILIRUBIN 0.9 1.3   LIPASE 131*  --    GLUCOSE 123* 94     Recent Labs     03/12/22 1626 03/12/22  1631 03/13/22  0400   WBC  --  7.8 11.6*   NEUTSPOLYS  --  55.70  --    LYMPHOCYTES  --  33.90  --    MONOCYTES  --  7.80  --    EOSINOPHILS  --  0.00  --    BASOPHILS  --  1.70  --    ASTSGOT 401*  --  646*   ALTSGPT 264*  --  388*   ALKPHOSPHAT 103*  --  95   TBILIRUBIN 0.9  --  1.3     Recent Labs     03/12/22  1626 03/12/22  1631 03/12/22 2327 03/13/22  0400   RBC  --  1.52*  --  2.05*   HEMOGLOBIN  --  5.4* 7.6* 6.9*   HEMATOCRIT  --  17.6* 23.5* 21.5*   PLATELETCT  --  169  --  160*   PROTHROMBTM 19.0*  --   --   --    APTT 39.8*  --   --   --    INR 1.64*  --   --   --        Imaging  X-Ray:  I have personally reviewed the images and compared with prior images.  CT:     Reviewed    Assessment/Plan  * GI bleed- (present on admission)  Assessment & Plan  Secondary to esophageal varices, EGD completed with 3 bands on 3/13/2022  Continue pantoprazole, Octreotide  GI on board    Acute respiratory failure with hypoxia (HCC)- (present on admission)  Assessment & Plan  Left intubated postoperatively on 3/13/2022  RT/O2 protocols  Daily and PRN ABGs  Titration of ventilator therapy based on ABGs and patient's status  Sedation as tolerated/indicated  Daily CXR  HOB >30 degrees and peridex for VAP prevention  Pepcid for GI prophylaxis  SAT/SBT when able (ABCDEF Bundle)  Early mobility    Esophageal varices with bleeding (HCC)- (present on admission)  Assessment & Plan  Banded x3 on 3/13/2022  GI on board    Hemorrhagic shock (HCC)- (present on admission)  Assessment & Plan  Secondary to variceal hemorrhage  Aggressively resuscitated with volume: Has received 6 units PRBC thus far  Calcium chloride 1 g IV    RUPAL (acute kidney injury) (HCC)- (present on admission)  Assessment & Plan  Likely ATN due to shock  Albumin 1g/kg today, consider redosing if HRS appears more likely  Renal dose meds, avoid nephrotoxins  Strict I/Os  Follow renal function  Nephrology consulted for HD    Coagulopathy (HCC)- (present on admission)  Assessment & Plan  PT/INR - 19.0/1.64  Received Vitamin K and FFP  TEG normal, will repeat this afternoon    Hepatitis C test positive- (present on admission)  Assessment & Plan  Hepatitis C (positive 3/12/22 @ [facility])  GI on board  HIV negative    Alcoholic cirrhosis of liver with ascites (HCC)- (present on admission)  Assessment & Plan  Likely EtOH and Hep C related cirrhosis  Prophylactic ceftriaxone  GI on board  MELD 27, 19.6% 3 month mortality  MDF 35, on steroids for shock  Child Class C    Acute blood loss anemia- (present on admission)  Assessment & Plan  Secondary to GI bleeding  Hemoglobin 5.4 on presentation (HGB earlier today at [facility] 8.2)  So far has received  6 units of PRBCs and 3 units of FFP  Correct coagulopathy, TEG looks good       VTE:  Contraindicated  Ulcer: PPI  Lines: None    I have performed a physical exam and reviewed and updated ROS and Plan today (3/13/2022). In review of yesterday's note (3/12/2022), there are no changes except as documented above.     Discussed patient condition and risk of morbidity and/or mortality with RN, RT, Pharmacy, Code status disscussed, Charge nurse / hot rounds, Patient and GI     The patient remains critically ill.  Critical care time = 91 minutes in directly providing and coordinating critical care and extensive data review.  No time overlap and excludes procedures.

## 2022-03-13 NOTE — CONSULTS
Gastroenterology Consult Note     Date of Consult: 3/12/2022    Requesting Physician: ED     Reason for consult: Hematemesis, melena     History of Present Illness: This is a 46-year old male with history of chronic alcohol use and recently diagnosed with hepatitis C who presents with hematemesis and melena. Hematemesis started today and melena has been ongoing for a few days. Associated symptoms include abdominal distention, pain and lower extremity edema. He takes several tablets of ibuprofen several times a week. He was never diagnosed with cirrhosis until recently when he went to .. He reports that he has not drank alcohol in almost 1 year and he stopped smoking marijuana for 9 months.     In the ED, patient was hypotensive. He received 3 units of PRBC, 1 unit FFP and 1 vitamin K. He is alert and oriented on exam. No further GI bleed on the floor and patient appears comfortable. Blood pressure is 160/60s.     Past Medical History:  Alcohol and polysubstance use     Past Surgical History:   None     Allergies  Patient has no known allergies.     Social History:   Social History     Socioeconomic History   • Marital status: Single     Spouse name: Not on file   • Number of children: Not on file   • Years of education: Not on file   • Highest education level: Not on file   Occupational History   • Not on file   Tobacco Use   • Smoking status: Current Every Day Smoker     Packs/day: 1.00     Types: Cigarettes   • Smokeless tobacco: Never Used   Substance and Sexual Activity   • Alcohol use: Yes   • Drug use: No   • Sexual activity: Not on file   Other Topics Concern   • Not on file   Social History Narrative   • Not on file     Social Determinants of Health     Financial Resource Strain: Not on file   Food Insecurity: Not on file   Transportation Needs: Not on file   Physical Activity: Not on file   Stress: Not on file   Social Connections: Not on file    Intimate Partner Violence: Not on file   Housing Stability: Not on file        Family History:   No family history on file.    Home Medications:  Home Medications    Medication Sig Taking? Last Dose Authorizing Provider   ibuprofen (MOTRIN) 200 MG Tab Take 200-400 mg by mouth every 6 hours as needed for Mild Pain. Yes prn at prn Physician Outpatient   spironolactone (ALDACTONE) 25 MG Tab Take 25 mg by mouth every day.  3/12/2022 at 1315 Physician Outpatient   traZODone (DESYREL) 50 MG Tab Take 50 mg by mouth at bedtime.  3/12/2022 at 1315 Physician Outpatient   zolpidem (AMBIEN) 10 MG Tab Take 10 mg by mouth at bedtime.  3/12/2022 at 1315 Physician Outpatient   hydrocodone-acetaminophen (NORCO) 5-325 MG Tab per tablet Take 1-2 Tabs by mouth every 6 hours as needed.  3/12/2022 at 1315 Brett Hope M.D.         Review of Systems:  General: No fevers, chills, unintentional, weight loss.  HEENT: No scleral icterus, gum bleed, dysphagia, odynophagia.  Cardiology: No chest pain, palpitations, orthopnea.  Respiratory: No dyspnea, cough, wheezing.  Gastrointestinal: (+) abdominal pain, distention, hematemesis, melena.   Genitourinary: No hematuria, dysuria, urgency.  Neurologic: No changes in memory, confusion, gait instability.  Skin: No ecchymosis, jaundice, telangiectasia.    Physical Exam:  Vitals:    03/12/22 1905 03/12/22 1915 03/12/22 1930 03/12/22 1945   BP: 148/67 (!) 172/72 (!) 169/65 119/58   Pulse: (!) 109 (!) 109 (!) 109 (!) 101   Resp: 20 (!) 25 20 15   Temp: 36.3 °C (97.4 °F) 36.6 °C (97.8 °F) 36.6 °C (97.9 °F) 36.6 °C (97.8 °F)   TempSrc:       SpO2: 98% 97% 96% 96%   Weight:       Height:         General: No acute cardiopulmonary distress.  Head: Normocephalic.  EENT: Scleral anicterus. Mucosa moist.   Respiratory: Breath sounds present. Symmetrical rise of anterior thorax.  Cardiovascular: Normal S1, S2.  Gastrointestinal: Distended. Normoactive bowel sounds. No guarding.  Neurologic: Alert and  oriented.  Skin: Warm and dry.      LABS:  Lab Results   Component Value Date/Time    SODIUM 133 (L) 03/12/2022 04:26 PM    POTASSIUM 5.0 03/12/2022 04:26 PM    CHLORIDE 106 03/12/2022 04:26 PM    CO2 17 (L) 03/12/2022 04:26 PM    GLUCOSE 123 (H) 03/12/2022 04:26 PM    BUN 47 (H) 03/12/2022 04:26 PM    CREATININE 1.75 (H) 03/12/2022 04:26 PM      Lab Results   Component Value Date/Time    WBC 7.8 03/12/2022 04:31 PM    RBC 1.52 (L) 03/12/2022 04:31 PM    HEMOGLOBIN 5.4 (LL) 03/12/2022 04:31 PM    HEMATOCRIT 17.6 (LL) 03/12/2022 04:31 PM    .8 (H) 03/12/2022 04:31 PM    MCH 35.5 (H) 03/12/2022 04:31 PM    MCHC 30.7 (L) 03/12/2022 04:31 PM    MPV 11.2 03/12/2022 04:31 PM    NEUTSPOLYS 55.70 03/12/2022 04:31 PM    LYMPHOCYTES 33.90 03/12/2022 04:31 PM    MONOCYTES 7.80 03/12/2022 04:31 PM    EOSINOPHILS 0.00 03/12/2022 04:31 PM    BASOPHILS 1.70 03/12/2022 04:31 PM    ANISOCYTOSIS 3+ (A) 03/12/2022 04:31 PM        Lab Results   Component Value Date/Time    PROTHROMBTM 19.0 (H) 03/12/2022 04:26 PM    INR 1.64 (H) 03/12/2022 04:26 PM      Recent Labs     03/12/22  1626   ASTSGOT 401*   ALTSGPT 264*   TBILIRUBIN 0.9   GLOBULIN 2.9   INR 1.64*       IMAGING: Reviewed personally and summarized in HPI.    Principal Problem:    GI bleed POA: Yes  Active Problems:    Acute blood loss anemia POA: Yes    Alcoholic cirrhosis of liver with ascites (HCC) POA: Yes    Hepatitis C test positive POA: Yes    Coagulopathy (HCC) POA: Yes  Resolved Problems:    * No resolved hospital problems. *          ASSESSMENT:   1.  Upper GI bleed with melena and hematemesis  2.  Acute blood loss anemia  3.  Acute hepatocellular injury  4.  Decompensated alcoholic liver disease  5.  Hepatitis C (never treated)  6.  NSAIDs use  7.  Acute liver inury  8.  Abdominal ascites     PLAN:   This is a 46-year old male with upper GI bleed and alcoholic and HCV liver disease. Bleeding can be due varices, peptic ulcer vs angiodysplasia. MELD score is 20.  AST:ALT ratio is suggestive of alcohol use but patient reports being alcohol-free for almost 9 months.    Recommendations:  1. PPI, octreotide and antibiotics.  2. EGD with bleeding therapy tomorrow. Risks, benefits and alternative discussed, including perforation, anesthesia side effect, pain. Patient agreed too proceed.  3. Paracentesis with ascitic study after GI bleed is controlled.  4. Hepatitis C PCR and genotype.  5. Outpatient HCV treatment and liver workup.  6. Avoid ETOH and hepatoxic drugs.  7. Consider albumin for volume expansion.  8. Transfuse if hemoglobin <7.  9. NPO until endoscopy.    Discussed with the patient's nurse.    Thank you for the consultation. Please call with any questions or concerns.      Belinda Giron D.O.  Gastroenterology  [facility]  [phone]

## 2022-03-13 NOTE — ED NOTES
Magalis from lab called with a critical result of hemoglobin 5.4 and hematocrit 17.6 at 1710. Critical lab result read back to Autumn. Dr. Meehan notified of critical lab result at 1712. Critical lab result read back by Dr. Meehan.

## 2022-03-13 NOTE — PROCEDURES
.Procedure Performed: Esophagogastroduodenoscopy (EGD) with esophageal varices banding x3     Indication for Procedure: Hematemesis, melena, acute blood loss anemia     Procedure Date: 3/13/2022    Performing Physician: Luis Brooks D.O.    Informed Consent: Before the procedure was performed, the risks, benefits, and complications of the procedure were explained in layman's terms to the patient and understood by the patient. The risks included, but were not limited to the risks of anesthesia/sedation, bleeding, pneumothorax, perforation, adverse reaction to medication, acute respiratory failure and possible death. The patient fully understood the risks and benefits and requested the procedure be performed.       Consent Obtained: From the patient.            Anesthesia: Please see anesthesia records.     Findings:   1. Esophagus: 3 columns of esophageal varices without evidence of active bleeding. 1 column of grade 1 varices without high risk stigmata, 1 column of grade 2 varices without high risk stigmata, 1 column of grade 2 varices with red kareem sign. S/p successful esophageal varices banding x3 with adequate decompression of all 3 columns.  2. Stomach: Scattered old blood throughout the stomach especially in the fundus.  Copious amount of irrigation and suction performed.  A small amount of old blood clot burden was in the fundus at the end of the procedure unable to fully suctioned however no evidence of active bleeding or gastric varices. The pylorus is patent.  3. Duodenum: Visualized portions of the 1st and 2nd duodenum appeared normal.    Procedure Summary: Timeout was performed. The patient was placed in the left lateral decubitus position. An Olympus adult gastroscope was gently passed into the oropharynx and esophagus and advanced into the 2nd part of duodenum. The scope was then drawn back into the stomach. Retroflexion was done to visualize the fundus and cardia of the stomach. The gastroscope was then  withdrawn while carefully inspecting the esophageal mucosa. Continuous monitoring of pulse oximetry, heart rhythm and blood pressure were done before, during and after the procedure.       Estimated Blood Loss, if any: None      Unusual Events or Complications: Patient remained intubated postprocedural, see anesthesia notes.     Specimen(s), if any: None     Disposition:     -Patient remained intubated postprocedure, see anesthesia notes.  Continue care in the ICU.    -No NG tube insertion for at least 3 days due to recent esophageal banding.  Keep n.p.o. for now.  -Continue octreotide drip for additional 72 hours or until 3/16.  Subsequently if blood pressure/heart rate are appropriate start nonselective beta-blocker for secondary esophageal varices prophylaxis  -Discontinue Protonix drip.  PPI twice daily for 14 days to prevent post banding ulceration, IV for now and transition to p.o. when patient tolerate oral diet  -Continue SBP prophylaxis for total 7 days  -Monitor hemoglobin and transfuse as needed for hemoglobin less than 7.0  -Repeat EGD in the outpatient setting in 3-4 weeks for esophageal varices surveillance  -Continue work-up and management of hepatitis C and liver disease as previously in consult note

## 2022-03-13 NOTE — PROGRESS NOTES
Monitor Summary  Rhythm: Simus Tach  HR: 100s-120s  Ectopy:  Measurements: .18/.12/.36      12 hour chart check

## 2022-03-13 NOTE — ED NOTES
Report given to Demond JOHNSON via report at the bedside. Patient handed off in stable condition. Third pRBC unit running. Morphine given per MAR. Safety measures in place. Call light within reach. Care relinquished. Family at bedside.

## 2022-03-13 NOTE — PROGRESS NOTES
4 Eyes Skin Assessment Completed by ALEX Davalos and ALEX Yun.    Head WDL  Ears WDL  Nose WDL  Mouth WDL  Neck WDL  Breast/Chest WDL  Shoulder Blades WDL  Spine WDL  (R) Arm/Elbow/Hand WDL  (L) Arm/Elbow/Hand WDL  Abdomen WDL edema/swelling  Groin Swelling  Scrotum/Coccyx/Buttocks WDL Swelling/edema  (R) Leg Swelling and Edema  (L) Leg Swelling and Edema  (R) Heel/Foot/Toe Bruising toenail  (L) Heel/Foot/Toe Bruising toenail          Devices In Places ECG, Blood Pressure Cuff, Pulse Ox and Nasal Cannula      Interventions In Place Pillows and Pressure Redistribution Mattress    Possible Skin Injury No    Pictures Uploaded Into Epic N/A  Wound Consult Placed N/A  RN Wound Prevention Protocol Ordered No

## 2022-03-13 NOTE — ANESTHESIA TIME REPORT
Anesthesia Start and Stop Event Times     Date Time Event    3/13/2022 0858 Ready for Procedure     0910 Anesthesia Start     1045 Anesthesia Stop        Responsible Staff  03/13/22    Name Role Begin End    Bryan Chacko M.D. Anesth 0910 1045        Preop Diagnosis (Free Text):  Pre-op Diagnosis     ANEMIA         Preop Diagnosis (Codes):    Premium Reason  E. Weekend    Comments:

## 2022-03-13 NOTE — CONSULTS
"Critical Care Consultation    Date of consult: 3/12/2022    Referring Physician  Quinn Washington M.D.    Reason for Consultation  GI Bleed    History of Presenting Illness  46 y.o. male w/ history of cirrhosis who presented 3/12/2022 with hematemesis that started 30 minutes prior to arrival.  The patient was seen at [facility] this morning where he was told he has cirrhosis and was told to follow-up with a hepatologist on Monday.  He then went home and developed worsening RUQ abdominal pain (present for the last week) and hematemesis.  He endorses dark stool, but denies any recent nausea or vomiting, no BRBPR, no history of similar.  He says he stopped drinking EtOH about 9 months ago and stopped illicit drugs \"years ago.\"    Code Status  Full Code    Review of Systems  Review of Systems   Constitutional: Positive for malaise/fatigue.   HENT: Negative for sore throat.    Respiratory: Negative for shortness of breath.    Cardiovascular: Positive for leg swelling. Negative for chest pain.   Gastrointestinal: Positive for abdominal pain and melena.   Skin: Negative for itching.   Endo/Heme/Allergies: Bruises/bleeds easily.   All other systems reviewed and are negative.      Past Medical History   has a past medical history of Jaundice.    Surgical History   has no past surgical history on file.    Family History  family history is not on file.    Social History   reports that he has been smoking cigarettes. He has been smoking about 1.00 pack per day. He has never used smokeless tobacco. He reports previous alcohol use. He reports previous drug use.    Medications  Home Medications     Reviewed by Karolina Faria (Pharmacy Tech) on 03/12/22 at 1652  Med List Status: Complete   Medication Last Dose Status   hydrocodone-acetaminophen (NORCO) 5-325 MG Tab per tablet 3/12/2022 Active   ibuprofen (MOTRIN) 200 MG Tab prn Active   spironolactone (ALDACTONE) 25 MG Tab 3/12/2022 Active   traZODone (DESYREL) 50 MG Tab " 3/12/2022 Active   zolpidem (AMBIEN) 10 MG Tab 3/12/2022 Active              Current Facility-Administered Medications   Medication Dose Route Frequency Provider Last Rate Last Admin   • pantoprazole (Protonix) 80 mg in  mL Infusion  8 mg/hr Intravenous Continuous Juliocesar Meehan M.D. 25 mL/hr at 03/12/22 1716 8 mg/hr at 03/12/22 1716   • octreotide (SANDOSTATIN) 1,250 mcg in  mL Infusion  50 mcg/hr Intravenous Continuous Quinn Washington M.D.       • cefTRIAXone (Rocephin) syringe 1 g  1 g Intravenous Q24HRS Quinn Washington M.D.   1 g at 03/12/22 1944   • Pharmacy Consult Request   Other PHARMACY TO DOSE Quinn Washington M.D.       • detox IV 1000 mL (D5LR + magnesium 1 g + thiamine 100 mg + folic acid 1 mg) infusion   Intravenous Once Quinn Washington M.D.        And   • [START ON 3/13/2022] thiamine (Vitamin B-1) tablet 100 mg  100 mg Oral DAILY Quinn Washington M.D.        And   • [START ON 3/13/2022] folic acid (FOLVITE) tablet 1 mg  1 mg Oral DAILY Quinn Washington M.D.        And   • [START ON 3/13/2022] multivitamin (THERAGRAN) tablet 1 Tablet  1 Tablet Oral DAILY Quinn Washington M.D.       • phytonadione (AQUA-MEPHYTON) 10 mg in NS 50 mL IVPB  10 mg Intravenous Once Quinn Washington M.D. 50 mL/hr at 03/12/22 2115 10 mg at 03/12/22 2115   • metoclopramide (REGLAN) injection 10 mg  10 mg Intravenous Q6HRS Belinda Giron D.O.   10 mg at 03/12/22 2118       Allergies  No Known Allergies    Vital Signs last 24 hours  Temp:  [35.9 °C (96.7 °F)-36.7 °C (98.1 °F)] 36.7 °C (98.1 °F)  Pulse:  [] 108  Resp:  [15-36] 19  BP: ()/(40-83) 140/76  SpO2:  [94 %-100 %] 94 %    Physical Exam  Physical Exam  Vitals and nursing note reviewed. Exam conducted with a chaperone present.   Constitutional:       Appearance: He is ill-appearing and toxic-appearing.   HENT:      Head: Normocephalic and atraumatic.      Right Ear: External ear normal.      Left Ear: External ear normal.       Nose: Nose normal.      Mouth/Throat:      Mouth: Mucous membranes are moist.      Pharynx: Oropharynx is clear.   Eyes:      General: Scleral icterus present.      Extraocular Movements: Extraocular movements intact.      Pupils: Pupils are equal, round, and reactive to light.   Cardiovascular:      Rate and Rhythm: Regular rhythm. Tachycardia present.      Pulses: Normal pulses.      Heart sounds: Normal heart sounds.   Pulmonary:      Effort: Pulmonary effort is normal.      Breath sounds: Normal breath sounds.   Abdominal:      General: There is distension.      Palpations: Abdomen is soft.   Musculoskeletal:         General: Normal range of motion.      Cervical back: Normal range of motion and neck supple.      Right lower leg: Edema present.      Left lower leg: Edema present.   Skin:     General: Skin is warm.      Capillary Refill: Capillary refill takes 2 to 3 seconds.      Coloration: Skin is jaundiced.      Findings: Bruising present.   Neurological:      Mental Status: He is oriented to person, place, and time.         Fluids  No intake or output data in the 24 hours ending 03/12/22 2149    Laboratory  Recent Results (from the past 48 hour(s))   COMP METABOLIC PANEL    Collection Time: 03/12/22  4:26 PM   Result Value Ref Range    Sodium 133 (L) 135 - 145 mmol/L    Potassium 5.0 3.6 - 5.5 mmol/L    Chloride 106 96 - 112 mmol/L    Co2 17 (L) 20 - 33 mmol/L    Anion Gap 10.0 7.0 - 16.0    Glucose 123 (H) 65 - 99 mg/dL    Bun 47 (H) 8 - 22 mg/dL    Creatinine 1.75 (H) 0.50 - 1.40 mg/dL    Calcium 7.8 (L) 8.5 - 10.5 mg/dL    AST(SGOT) 401 (H) 12 - 45 U/L    ALT(SGPT) 264 (H) 2 - 50 U/L    Alkaline Phosphatase 103 (H) 30 - 99 U/L    Total Bilirubin 0.9 0.1 - 1.5 mg/dL    Albumin 1.9 (L) 3.2 - 4.9 g/dL    Total Protein 4.8 (L) 6.0 - 8.2 g/dL    Globulin 2.9 1.9 - 3.5 g/dL    A-G Ratio 0.7 g/dL   LIPASE    Collection Time: 03/12/22  4:26 PM   Result Value Ref Range    Lipase 131 (H) 11 - 82 U/L   COD (ADULT)     Collection Time: 03/12/22  4:26 PM   Result Value Ref Range    ABO Grouping Only A     Rh Grouping Only POS     Antibody Screen-Cod NEG     Component R       R99                 Red Cells, LR       [id number]   transfused   03/12/22   17:52      Product Type R99     Dispense Status transfused     Unit Number (Barcoded) [id number]     Product Code (Barcoded) N3291T12     Blood Type (Barcoded) 6200     Component R       R33                 Red Blood Cells     [id number]   transfused   03/12/22   17:55      Product Type Red Blood Cells LR Pheresis     Dispense Status transfused     Unit Number (Barcoded) [id number]     Product Code (Barcoded) L8689U12     Blood Type (Barcoded) 0600     Component R       R99                 Red Cells, LR       [id number]   transfused   03/12/22   18:56      Product Type R99     Dispense Status transfused     Unit Number (Barcoded) [id number]     Product Code (Barcoded) M6038M89     Blood Type (Barcoded) 6200    APTT    Collection Time: 03/12/22  4:26 PM   Result Value Ref Range    APTT 39.8 (H) 24.7 - 36.0 sec   PROTHROMBIN TIME (INR)    Collection Time: 03/12/22  4:26 PM   Result Value Ref Range    PT 19.0 (H) 12.0 - 14.6 sec    INR 1.64 (H) 0.87 - 1.13   TSH    Collection Time: 03/12/22  4:26 PM   Result Value Ref Range    TSH 2.370 0.380 - 5.330 uIU/mL   ESTIMATED GFR    Collection Time: 03/12/22  4:26 PM   Result Value Ref Range    GFR If  51 (A) >60 mL/min/1.73 m 2    GFR If Non  42 (A) >60 mL/min/1.73 m 2   HIV AG/AB COMBO ASSAY DIAGNOSTIC    Collection Time: 03/12/22  4:26 PM   Result Value Ref Range    HIV Ag/Ab Combo Assay Non-Reactive Non Reactive   Blood Culture,Hold    Collection Time: 03/12/22  4:26 PM   Result Value Ref Range    Blood Culture Hold Collected    Blood Culture,Hold    Collection Time: 03/12/22  4:30 PM   Result Value Ref Range    Blood Culture Hold Collected    CBC WITH DIFFERENTIAL    Collection Time:  22  4:31 PM   Result Value Ref Range    WBC 7.8 4.8 - 10.8 K/uL    RBC 1.52 (L) 4.70 - 6.10 M/uL    Hemoglobin 5.4 (LL) 14.0 - 18.0 g/dL    Hematocrit 17.6 (LL) 42.0 - 52.0 %    .8 (H) 81.4 - 97.8 fL    MCH 35.5 (H) 27.0 - 33.0 pg    MCHC 30.7 (L) 33.7 - 35.3 g/dL    RDW 73.5 (H) 35.9 - 50.0 fL    Platelet Count 169 164 - 446 K/uL    MPV 11.2 9.0 - 12.9 fL    Neutrophils-Polys 55.70 44.00 - 72.00 %    Lymphocytes 33.90 22.00 - 41.00 %    Monocytes 7.80 0.00 - 13.40 %    Eosinophils 0.00 0.00 - 6.90 %    Basophils 1.70 0.00 - 1.80 %    Nucleated RBC 0.00 /100 WBC    Neutrophils (Absolute) 4.34 1.82 - 7.42 K/uL    Lymphs (Absolute) 2.64 1.00 - 4.80 K/uL    Monos (Absolute) 0.61 0.00 - 0.85 K/uL    Eos (Absolute) 0.00 0.00 - 0.51 K/uL    Baso (Absolute) 0.13 (H) 0.00 - 0.12 K/uL    NRBC (Absolute) 0.00 K/uL    Anisocytosis 3+ (A)     Macrocytosis 3+ (A)    ABO Rh Confirm    Collection Time: 22  4:31 PM   Result Value Ref Range    ABO Rh Confirm A POS    DIFFERENTIAL MANUAL    Collection Time: 22  4:31 PM   Result Value Ref Range    Myelocytes 0.90 %    Manual Diff Status PERFORMED    PERIPHERAL SMEAR REVIEW    Collection Time: 22  4:31 PM   Result Value Ref Range    Peripheral Smear Review see below    PLATELET ESTIMATE    Collection Time: 22  4:31 PM   Result Value Ref Range    Plt Estimation Normal    MORPHOLOGY    Collection Time: 22  4:31 PM   Result Value Ref Range    RBC Morphology Present     Polychromia 1+     Smudge Cells Moderate    EKG    Collection Time: 22  4:38 PM   Result Value Ref Range    Report       [facility] Emergency Dept.    Test Date:  2022  Pt Name:    NOHEMI FOREMAN                   Department:   MRN:        [id number]                      Room:       [facility]  Gender:     M                            Technician: [id number]  :        [id number]                   Requested By:MARISOL HARMAN  Order #:    [id number]                     Reading MD: MARISOL HARMAN. [facility]    Measurements  Intervals                                Axis  Rate:       98                           P:          52  MT:         135                          QRS:        54  QRSD:       126                          T:          21  QT:         381  QTc:        487    Interpretive Statements  Sinus rhythm  Right bundle branch block  No previous ECG available for comparison  Electronically Signed On 3- 20:09:39 PST by MARISOL HARMAN. AMD     [facility]    Collection Time: 03/12/22  6:08 PM   Result Value Ref Range    Component F       TA3                 Thawed Plasma 3     [id number]   issued       03/12/22   21:07      Product Type Thawed Apheresis Plasma 3rd Cont     Dispense Status issued     Unit Number (Barcoded) [id number]     Product Code (Barcoded) D5907H70     Blood Type (Barcoded) 6200        Imaging  CT-ABDOMEN-PELVIS WITH   Final Result         1. Moderate abdominal and pelvic ascites. No discrete fluid collection identified.      2. Mild diffuse wall thickening of the colon and small bowel could relate to ascites and liver disease. No small bowel obstruction.      3. Cholelithiasis.          Assessment/Plan  * GI bleed- (present on admission)  Assessment & Plan  Hematemesis  Concern for varices  Pantoprazole  Octreotide  Strict NPO  GI consultation    Coagulopathy (HCC)- (present on admission)  Assessment & Plan  PT/INR - 19.0/1.64  Vitamin K  FFP    Hepatitis C test positive- (present on admission)  Assessment & Plan  Hepatitis C (positive 3/12/22 @ [facility])  GI consultation  Check HIV test    Alcoholic cirrhosis of liver with ascites (HCC)- (present on admission)  Assessment & Plan  Likely EtOH cirrhosis  CMP  Prophylactic ceftriaxone  GI consultation    Acute blood loss anemia- (present on admission)  Assessment & Plan  Hemoglobin 5.4 (HGB earlier today at [facility] 8.2)  From GI bleed  Transfuse PRBCs for HGB < 7.0  Correct  coagulopathy      Discussed patient condition and risk of morbidity and/or mortality with Family, RN, RT, Pharmacy, Code status disscussed, Charge nurse / hot rounds, Patient, GI and ERP.    The patient remains critically ill.  Critical care time = 45 minutes in directly providing and coordinating critical care and extensive data review.  No time overlap and excludes procedures.

## 2022-03-13 NOTE — PROGRESS NOTES
0015  Communication with MD Paz  Updated MD on pt becoming hypotensive after receiving ordered blood products and IV fluids. Systolic pressures in the 60s to 80s, hr in the 110s to 130s.  Orders received for bolus LR and trended labs  At this time MD Paz and patient notified of medication error  0035  MD Paz notified of Hgb of 7.6  0045  MD Paz at [facility] to assess patient  IV pain medication ordered  0500  MD Paz notified Hgb of 6.9  One unit PRBC ordered, awaiting blood product from lab

## 2022-03-13 NOTE — CARE PLAN
The patient is Watcher - Medium risk of patient condition declining or worsening    Shift Goals  Clinical Goals: blood pressure management, trending lab values, plood product and electrolyte replacement as needed  Patient Goals: stop bleeding  Family Goals: get better    Progress made toward(s) clinical / shift goals:     Problem: Pain - Standard  Goal: Alleviation of pain or a reduction in pain to the patient’s comfort goal  Outcome: Progressing  Flowsheets (Taken 3/13/2022 0335)  OB Pain Intervention:   Medication - See MAR   Alvin   Distraction   Repositioned   Rest  Pain Rating Scale (NPRS): 5  Note: New medication ordered for pain, nonpharmacological interventions in place      Problem: Knowledge Deficit - Standard  Goal: Patient and family/care givers will demonstrate understanding of plan of care, disease process/condition, diagnostic tests and medications  Outcome: Progressing  Note: MD and nursing staff discussions had with patient throughout shift involving plan of care and results      Problem: Hemodynamics  Goal: Patient's hemodynamics, fluid balance and neurologic status will be stable or improve  Outcome: Progressing  Note: Pt given 3 units PRBC, 1 unit FFP, LR bolus, detox IV bag, lab values trended, H&H improved

## 2022-03-13 NOTE — PROCEDURES
"Arterial Line Insertion    Date/Time: 3/13/2022 2:11 PM  Performed by: GUILLE Ramey  Authorized by: GUILLE Ramey   Consent: The procedure was performed in an emergent situation.  Patient identity confirmed: arm band  Time out: Immediately prior to procedure a \"time out\" was called to verify the correct patient, procedure, equipment, support staff and site/side marked as required.  Preparation: Patient was prepped and draped in the usual sterile fashion.  Indications: respiratory failure and hemodynamic monitoring  Location: right radial  Needle gauge: 20  Seldinger technique: Seldinger technique used  Number of attempts: 2  Post-procedure: line sutured and dressing applied  Post-procedure CMS: unchanged  Patient tolerance: patient tolerated the procedure well with no immediate complications  Comments: Wire removed.        GUILLE Ramey        "

## 2022-03-13 NOTE — CARE PLAN
Problem: Ventilation  Goal: Ability to achieve and maintain unassisted ventilation or tolerate decreased levels of ventilator support  Description: Target End Date:  4 days     Document on Vent flowsheet    1.  Support and monitor invasive and noninvasive mechanical ventilation  2.  Monitor ventilator weaning response  3.  Perform ventilator associated pneumonia prevention interventions  4.  Manage ventilation therapy by monitoring diagnostic test results  Outcome: Progressing                                   Ventilator Daily Summary     Vent Day # 1     CMV: 24/450/+8/40%     Ventilator settings changed this shift: No     Weaning trials: No     Respiratory Procedures: No     Plan: Continue current ventilator settings and wean mechanical ventilation as tolerated per physician orders.

## 2022-03-13 NOTE — PROCEDURES
Central Line Insertion    Date/Time: 3/13/2022 3:56 PM  Performed by: Quinn Washington M.D.  Authorized by: Quinn Washington M.D.     Consent:     Consent obtained:  Emergent situation  Universal protocol:     Relevant documents present and verified: yes      Test results available and properly labeled: yes      Required blood products, implants, devices, and special equipment available: yes      Immediately prior to procedure, a time out was called: yes      Patient identity confirmed:  Anonymous protocol, patient vented/unresponsive  Pre-procedure details:     Hand hygiene: Hand hygiene performed prior to insertion      Sterile barrier technique: All elements of maximal sterile technique followed      Skin preparation:  2% chlorhexidine    Skin preparation agent: Skin preparation agent completely dried prior to procedure    Anesthesia:     Anesthesia method:  Local infiltration    Local anesthetic:  Lidocaine 1% w/o epi  Procedure details:     Location:  L internal jugular    Patient position:  Trendelenburg    Procedural supplies:  Triple lumen    Catheter size: 13 Fr.    Landmarks identified: yes      Ultrasound guidance: yes      Sterile ultrasound techniques: Sterile gel and sterile probe covers were used      Number of attempts:  1    Successful placement: yes    Post-procedure details:     Post-procedure:  Dressing applied and line sutured    Assessment:  Blood return through all ports    Patient tolerance of procedure:  Tolerated well, no immediate complications      Wire removal: After insertion of the catheter via Seldinger technique the guidewire was removed intact and confirmed by the assistant in the room.

## 2022-03-13 NOTE — ED PROVIDER NOTES
ED Provider Note    Scribed for Juliocesar Meehan M.D. by Claude Falcon. 3/12/2022, 4:21 PM.    Primary care provider: Chris Love M.D.  Means of arrival: EMS  History obtained from: Patient  History limited by: None    CHIEF COMPLAINT  Chief Complaint   Patient presents with   • Abdominal Pain     RUQ pain and tenderness   • Blood in Vomit     About 500 ml per EMS   • Dizziness     Since last night       CHERRY Lopez is a 46 y.o. male who presents to the Emergency Department via EMS for evaluation of hematemesis.  He had abdominal pain for about a week.  The pain is diffuse but mostly the right upper quadrant.  Pain is severe.  Other than the hematemesis denies any vomiting.  Has had black stools.  Was recently told that he has cirrhosis.  Denies any previous abdominal operations.  Denies any previous endoscopy or colonoscopy.  Denies any blood thinners.  But denies any bright red blood in his stool. Patient denies attempting any alleviating factors. Patient states he smokes and occasionally drinks alcohol. He denies any other drug use. Patient states he has a history of cirrhosis but denies any other history of liver issues.    REVIEW OF SYSTEMS  Pertinent positives include abdominal distension, black colored stool, leg swelling, and right upper quadrant abdominal pain. Pertinent negatives include no bright red blood in stool .  All other systems reviewed and negative.    C    PAST MEDICAL HISTORY   Cirrhosis.    SURGICAL HISTORY  patient denies any surgical history    SOCIAL HISTORY  Social History     Tobacco Use   • Smoking status: Current Every Day Smoker     Packs/day: 1.00     Types: Cigarettes   • Smokeless tobacco: Never Used   Substance Use Topics   • Alcohol use: Yes   • Drug use: No      Social History     Substance and Sexual Activity   Drug Use No       FAMILY HISTORY  None noted when reviewed    CURRENT MEDICATIONS  Home Medications    Current medications can be seen on the nurse's note.      "      ALLERGIES  No Known Allergies    PHYSICAL EXAM  VITAL SIGNS: /56   Pulse (!) 113   Temp 36.1 °C (96.9 °F)   Resp (!) 21   Ht 1.803 m (5' 11\")   Wt 83.9 kg (185 lb)   SpO2 98%   BMI 25.80 kg/m²   Vitals reviewed.  Constitutional: Ill appearing, moderate distress, Non-toxic appearance, somnolent but rousable.   HENT: Normocephalic, Atraumatic, Bilateral external ears normal, Oropharynx moist, No oral exudates, Nose normal.   Eyes: PERRL, EOMI, Pale Conjunctiva, No discharge.   Neck: Normal range of motion, No tenderness, Supple, No stridor.   Cardiovascular: tachycardic heart rate, Normal rhythm, No murmurs, No rubs, No gallops.   Thorax & Lungs: Normal breath sounds, No respiratory distress, No wheezing, No chest tenderness.   Abdomen: Bowel sounds normal, Soft, Distended, Diffuse tenderness concentrated in right upper quadrant.  Skin: Warm, Dry, No erythema, No rash.   Rectal : Grossly melanotic stool  Back: No tenderness, No CVA tenderness.   Musculoskeletal: Good range of motion in all major joints. Moderate edema to bilateral lower extremities. No tenderness to palpation or major deformities noted.   Neurologic: Alert, Normal motor function, Normal sensory function, No focal deficits noted.   Psychiatric: Affect normal    LABS  Results for orders placed or performed during the hospital encounter of 03/12/22   CBC WITH DIFFERENTIAL   Result Value Ref Range    WBC 7.8 4.8 - 10.8 K/uL    RBC 1.52 (L) 4.70 - 6.10 M/uL    Hemoglobin 5.4 (LL) 14.0 - 18.0 g/dL    Hematocrit 17.6 (LL) 42.0 - 52.0 %    .8 (H) 81.4 - 97.8 fL    MCH 35.5 (H) 27.0 - 33.0 pg    MCHC 30.7 (L) 33.7 - 35.3 g/dL    RDW 73.5 (H) 35.9 - 50.0 fL    Platelet Count 169 164 - 446 K/uL    MPV 11.2 9.0 - 12.9 fL    Neutrophils-Polys 55.70 44.00 - 72.00 %    Lymphocytes 33.90 22.00 - 41.00 %    Monocytes 7.80 0.00 - 13.40 %    Eosinophils 0.00 0.00 - 6.90 %    Basophils 1.70 0.00 - 1.80 %    Nucleated RBC 0.00 /100 WBC    " Neutrophils (Absolute) 4.34 1.82 - 7.42 K/uL    Lymphs (Absolute) 2.64 1.00 - 4.80 K/uL    Monos (Absolute) 0.61 0.00 - 0.85 K/uL    Eos (Absolute) 0.00 0.00 - 0.51 K/uL    Baso (Absolute) 0.13 (H) 0.00 - 0.12 K/uL    NRBC (Absolute) 0.00 K/uL    Anisocytosis 3+ (A)     Macrocytosis 3+ (A)    COMP METABOLIC PANEL   Result Value Ref Range    Sodium 133 (L) 135 - 145 mmol/L    Potassium 5.0 3.6 - 5.5 mmol/L    Chloride 106 96 - 112 mmol/L    Co2 17 (L) 20 - 33 mmol/L    Anion Gap 10.0 7.0 - 16.0    Glucose 123 (H) 65 - 99 mg/dL    Bun 47 (H) 8 - 22 mg/dL    Creatinine 1.75 (H) 0.50 - 1.40 mg/dL    Calcium 7.8 (L) 8.5 - 10.5 mg/dL    AST(SGOT) 401 (H) 12 - 45 U/L    ALT(SGPT) 264 (H) 2 - 50 U/L    Alkaline Phosphatase 103 (H) 30 - 99 U/L    Total Bilirubin 0.9 0.1 - 1.5 mg/dL    Albumin 1.9 (L) 3.2 - 4.9 g/dL    Total Protein 4.8 (L) 6.0 - 8.2 g/dL    Globulin 2.9 1.9 - 3.5 g/dL    A-G Ratio 0.7 g/dL   LIPASE   Result Value Ref Range    Lipase 131 (H) 11 - 82 U/L   COD (ADULT)   Result Value Ref Range    ABO Grouping Only A     Rh Grouping Only POS     Antibody Screen-Cod NEG     Component R       R99                 Red Cells, LR       [id number]   transfused   03/12/22   17:52      Product Type R99     Dispense Status transfused     Unit Number (Barcoded) [id number]     Product Code (Barcoded) W0936H17     Blood Type (Barcoded) 6200     Component R       R33                 Red Blood Cells     [id number]   transfused   03/12/22   17:55      Product Type Red Blood Cells LR Pheresis     Dispense Status transfused     Unit Number (Barcoded) [id number]     Product Code (Barcoded) W2207T36     Blood Type (Barcoded) 0600     Component R       R99                 Red Cells, LR       [id number]   transfused   03/12/22   18:56      Product Type R99     Dispense Status transfused     Unit Number (Barcoded) [id number]     Product Code (Barcoded) S5680A66     Blood Type (Barcoded) 6200    APTT   Result Value  Ref Range    APTT 39.8 (H) 24.7 - 36.0 sec   PROTHROMBIN TIME (INR)   Result Value Ref Range    PT 19.0 (H) 12.0 - 14.6 sec    INR 1.64 (H) 0.87 - 1.13   TSH   Result Value Ref Range    TSH 2.370 0.380 - 5.330 uIU/mL   ABO Rh Confirm   Result Value Ref Range    ABO Rh Confirm A POS    ESTIMATED GFR   Result Value Ref Range    GFR If  51 (A) >60 mL/min/1.73 m 2    GFR If Non  42 (A) >60 mL/min/1.73 m 2   DIFFERENTIAL MANUAL   Result Value Ref Range    Myelocytes 0.90 %    Manual Diff Status PERFORMED    PERIPHERAL SMEAR REVIEW   Result Value Ref Range    Peripheral Smear Review see below    PLATELET ESTIMATE   Result Value Ref Range    Plt Estimation Normal    MORPHOLOGY   Result Value Ref Range    RBC Morphology Present     Polychromia 1+     Smudge Cells Moderate    Blood Culture,Hold   Result Value Ref Range    Blood Culture Hold Collected    Blood Culture,Hold   Result Value Ref Range    Blood Culture Hold Collected    EKG   Result Value Ref Range    Report       [facility] Emergency Dept.    Test Date:  2022  Pt Name:    NOHEMI FOREMAN                   Department: ER  MRN:        [id number]                      Room:       [facility]  Gender:     M                            Technician: [id number]  :        [id number]                   Requested By:MARISOL HARMAN  Order #:    [id number]                    Reading MD: MARISOL HARMAN. MEMO    Measurements  Intervals                                Axis  Rate:       98                           P:          52  AK:         135                          QRS:        54  QRSD:       126                          T:          21  QT:         381  QTc:        487    Interpretive Statements  Sinus rhythm  Right bundle branch block  No previous ECG available for comparison  Electronically Signed On 3- 20:09:39 PST by MARISOL HARMAN. MEMO        All labs reviewed by me.    EKG Interpretation  12 lead EKG interpreted by me as  seen above.      RADIOLOGY  CT-ABDOMEN-PELVIS WITH   Final Result         1. Moderate abdominal and pelvic ascites. No discrete fluid collection identified.      2. Mild diffuse wall thickening of the colon and small bowel could relate to ascites and liver disease. No small bowel obstruction.      3. Cholelithiasis.        The radiologist's interpretation of all radiological studies have been reviewed by me.    COURSE & MEDICAL DECISION MAKING  Pertinent Labs & Imaging studies reviewed. (See chart for details)    Obtained and reviewed past medical records from previous visit for baseline labs and previous imaging for work-up.    4:21 PM Patient seen and examined at bedside. The patient presents with black colored stool, leg swelling, and right upper quadrant abdominal pain, and the differential diagnosis includes but is not limited to upper GI bleed. Ordered for EKG, lipase, COD, APTT, INR, TSH, CBC with diff, and CMP to evaluate. Patient will be treated with Protonix 80 mg twice for his symptoms.      The patient looks anemic, he is tachycardic and expect he has ongoing bleeding.  He is managed with 2 peripheral IVs a fluid bolus COD and anticipated transfusion.  History of PPI.      5:14 PM - I was made aware that the patient had a hemoglobin of 5 and will treat him with blood.  Consent for blood was added and blood will be ordered and started promptly.    .I have explained to the patient the risks and benefits of transfusion of blood products.  This includes, as appropriate, the risk of mild allergic reaction, hemolytic reaction, transfusion-associated lung injury, febrile reactions, circulatory or iron overload, and infection.    We discussed possible alternatives and their risks, including directed donation, autologous transfusion, and no transfusion, including IV or oral iron supplementation, as appropriate.  I believe the patient understands the risks and benefits and was able to express  understanding.      5:33 PM - Patient was reevaluated at bedside. Discussed lab results with the patient and informed them that their blood levels are dangerously low. Asked the patient for consent to do a blood transfusion and consent was gained.      HYDRATION: Based on the patient's presentation of Tachycardia the patient was given IV fluids. IV Hydration was used because oral hydration was not adequate alone. Upon recheck following hydration, the patient was improved.     The patient's been hypotensive his hemoglobin is 5 and has an ongoing GI bleed.  He has a very tender abdomen.  I done a CT is multiple abnormalities but none is acutely surgical.  He requires ongoing resuscitation.  Because of his likely continued GI bleeding, hemodynamically stable and hemoglobin of 5 spoke with ICU.  They will see him in consultation.      I spoke with Dr. Welsh on-call for GI.  She recommended Rocephin IV for SBP prophylaxis.  I have asked the pharmacist to add this.      The patient be hospitalized in critical condition.  Care transferred to ICU team.      DISPOSITION:  Patient will be hospitalized by Dr. Washington in guarded condition.     FINAL IMPRESSION  1. Upper GI bleed    2. Hematemesis, presence of nausea not specified    3. Generalized abdominal pain    4. Other ascites    5. Blood loss anemia    6. Shock (HCC)    7.  Critical care time of 1 hour no procedures.     I, Claude Falcon (Hamzah), am scribing for, and in the presence of, Juliocesar Meehan M.D..    Electronically signed by: Claude Falcon (Hamzah), 3/12/2022    Juliocesar NEWELL M.D. personally performed the services described in this documentation, as scribed by Claude Falcon in my presence, and it is both accurate and complete.    The note accurately reflects work and decisions made by me.  Juliocesar Meehan M.D.  3/12/2022  8:13 PM

## 2022-03-14 PROBLEM — R57.9 SHOCK (HCC): Status: ACTIVE | Noted: 2022-01-01

## 2022-03-14 PROBLEM — E27.40 ADRENAL INSUFFICIENCY (HCC): Status: ACTIVE | Noted: 2022-01-01

## 2022-03-14 PROBLEM — R18.8 CIRRHOSIS OF LIVER WITH ASCITES (HCC): Status: ACTIVE | Noted: 2022-01-01

## 2022-03-14 PROBLEM — K74.60 CIRRHOSIS OF LIVER WITH ASCITES (HCC): Status: ACTIVE | Noted: 2022-01-01

## 2022-03-14 NOTE — PROGRESS NOTES
46 year old male with cirrhosis admitted with a GIB now in refractory shock on the ventilator. He is on ceftriaxone, pH 7.2, no clinical bleeding, last h/h stable. Called for persistent hypotension after dialysis: levo range increased to 45 mcg/min, 500 cc LR bolus, propofol switched to versed gtt. Bicarb gtt to temporize metabolic acidosis.     The patient remains critically ill.  Critical care time = 24 minutes in directly providing and coordinating critical care and extensive data review.  No time overlap and excludes procedures.     Hector Kat M.D.

## 2022-03-14 NOTE — PROGRESS NOTES
Approximately 0100, JAYDEN Bettencourty notified of RASS goal for Versed gtt. When attempting to decrease sedation to meet RASS goals, patient would become asynchronous with ventilator.      Orders modified for vent synchrony.
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
## 2022-03-14 NOTE — PROGRESS NOTES
Spiritual Care Note        Patient's Name: Fam Santana   MRN: [id number]    YOB: 1975   Age and Gender: 46 y.o. male   Unit/Service Area: [facility]   Room (and Bed): [facility]   Ethnicity or Nationality: white   Primary Language: English   [employer]/Spiritual Preference: None   Place of Residence: [place], [place]   Family/Friends/Others Present: adopted father  significant other   Medical Diagnoses: acute upper GI bleed 2/2 esophageal varices  shock   acute respiratory failure with hypoxia   hemorrhagic shock   adrenal insufficiency   RUPAL  hepatitis C test positive  cirrhosis of liver with ascites  acute blood loss anemia   Code Status: Comfort Care/DNR    Date of Admission: 3/12/2022   Length of Stay: 2 days        Request Information  Visited with:          Patient and family together  Nature of the Visit:          Initial, On Shift  Crisis Visit:          Patient actively dying/EOL  Referral from/Origin of Request:   Verbal, from Staff, by Phone      Encounter Information  Observations/Symptoms:     Patient laying in bed, unable to communicate.          Family bedside, tearful.  Interaction/Conversation:    Family requested prayer  Assessment:      Need, Distress  Need:       Seeking Spiritual Assistance and Support  Distress:      Grief/Loss/Bereavement  [employer]ist & Ritual Needs:    EOL Ritual -- Final Prayer of Commendation  Intended Effects:     Demonstrate Caring and Concern, Helping Someone Feel Comforted  Interventions:      Compassionate Presence, Emotional Support, Prayer  Outcomes:      Emotional Release, Progress with Grief, Spiritual Comfort  Total Time:      15 minutes      Spiritual Care Provider   homar Barahona  [phone]   [email or web address]

## 2022-03-14 NOTE — PROGRESS NOTES
Monitor check called 1046 for flat art line/bradycardia registering at 20s-30's on monitor, echo was in room doing procedure. Code cart brought in, pads placed on pt.   1047: 1mg atropine, hr 30's and pressure 63/3. Levo gtt increased to 60, hema gtt increased to 300  1048: pacing pt with 100% capture at 70. 1gm calcium chloride given, hema increased to 400  1049: 1amp bicarb given, pressure 76/36 hr 70. Levo increased to 70, bp 84/39 hr 70  1050: pacing stopped to check underlying rhythm per MD Smith. Pt has underlying rhythm but it is irregular, rate 120's with p waves but goes to wide complex for a couple secs and sometimes drops to 60's for a few seconds before coming back up. Pacing restarted as backup. Sat 68%  1052:  bp 89/43, rhythm still fluctuating/irregular going into wide complex  1100: md jacome talked to family, decision made to go comfort.  called to bedside

## 2022-03-14 NOTE — CARE PLAN
The patient is Unstable - High likelihood or risk of patient condition declining or worsening    Shift Goals  Clinical Goals: Hemodynamic Stability, decrease vasopressors and sedation  Patient Goals: BRETT  Family Goals: n/a    Progress made toward(s) clinical / shift goals:       Problem: Pain - Standard  Goal: Alleviation of pain or a reduction in pain to the patient’s comfort goal  Outcome: Progressing     Problem: Safety - Medical Restraint  Goal: Remains free of injury from restraints (Restraint for Interference with Medical Device)  Outcome: Progressing     Patient is not progressing towards the following goals:  Patient required transfusion of PRBC's and Albumin for hemodynamic support, as well as several fluid bolus' and increased vasopressor support.  Despite several changes to vent settings worsening ventilation/perfusion    Called MD's to bedside on several occasions for worsening hemodynamics despite maxing out vasopressors.   Lactic 10.9 by end of shift, AST increased to 1600.    Gave insulin+dextrose for hyperkalemia, calcium for blood products/hyperkalemia, increased vasopressor maximum dosages as well as concentrations.      Problem: Hemodynamics  Goal: Patient's hemodynamics, fluid balance and neurologic status will be stable or improve  Outcome: Not Progressing     Problem: Fluid Volume  Goal: Fluid volume balance will be maintained  Outcome: Not Progressing

## 2022-03-14 NOTE — DISCHARGE SUMMARY
Death Summary    Cause of Death  Acute upper gastrointestinal hemorrhage due to esophageal varices due to alcoholic cirrhosis.    Comorbid Conditions at the Time of Death  Principal Problem:    Acute upper GI bleed POA: Yes  Active Problems:    Hemorrhagic shock (HCC) POA: Yes    Acute respiratory failure with hypoxia (HCC) POA: Yes    Shock (HCC) POA: Unknown    Acute blood loss anemia POA: Yes    Cirrhosis of liver with ascites (HCC) POA: Yes    Hepatitis C test positive POA: Yes    Coagulopathy (HCC) POA: Yes    RUPAL (acute kidney injury) (HCC) POA: Yes    Adrenal insufficiency (HCC) POA: Unknown    Hyperkalemia POA: Yes  Resolved Problems:    * No resolved hospital problems. *      History of Presenting Illness and Hospital Course  This is a 46 y.o. male admitted 3/12/2022 with an acute upper gastrointestinal hemorrhage.  He underwent emergent EGD which revealed esophageal varices with evidence of recent hemorrhage.  He underwent banding.  He was appropriately transfused PRBCs and FFP.  He was treated with pantoprazole and octreotide.  He unfortunately developed multisystem organ failure with worsening hepatic and renal function.  He had refractory acidosis and shock.  Despite aggressive therapy with vasopressors, albumin and blood products he continued to decline.  Discussions were held with his significant others.  He was transitioned to comfort care and he  peacefully.    Death Date: 22   Death Time: 1159         Pronounced By (RN1): Enoc Workman  Pronounced By (RN2): Dawna Venegas MD  Pulmonary and Critical Care Medicine

## 2022-03-14 NOTE — ASSESSMENT & PLAN NOTE
Acute undifferentiated shock, not otherwise specified  Differential diagnosis includes septic shock, vasodistributive shock, relative adrenal insufficiency  I am titrating norepinephrine and phenylephrine to keep mean arterial pressure greater than 65  Culture blood and sputum  Change ceftriaxone to Zosyn and linezolid  Check nasal RSA PCR

## 2022-03-14 NOTE — PROGRESS NOTES
Gastroenterology Progress Note     Author: Lor Barragan M.D.   Date & Time Created: 3/14/2022 10:15 AM    Chief Complaint: Hematemesis and melena  This is a 46-year old male with history of chronic alcohol use and recently diagnosed with hepatitis C who presented originally with hematemesis and melena in the setting of taking several tablets of ibuprofen weekly. He was diagnosed recently at [facility] for liver cirrhosis. On , he underwent an EGD with finding of esophageal varices without active bleeding s/p banding, no evidence of gastric bleeding, and normal appearing duodenum.    Interval History:  -Unfortunately, it appears that patient's condition has been progressively worsening.  This morning a code was called because of severe bradycardia.  He was on atropine and has been on maximum pressors.  -He has had persistent hypotension, and his liver enzymes have been persistently worsening.  He is having a severe decompensated cirrhosis.  -Decision was made with those acting as his target to start comfort measures.  -Later, patient unfortunately     Review of Systems:  Review of Systems   Unable to perform ROS: Patient unresponsive       Current Facility-Administered Medications:   •  albumin human 25% solution 40 g, 40 g, Intravenous, DAILY, Daniel Paz M.D., Last Rate: 150 mL/hr at 22, 40 g at 22  •  norepinephrine (Levophed) infusion 32 mg/500 mL (premix), 0-45 mcg/min, Intravenous, Continuous, Daniel Paz M.D., Last Rate: 42.2 mL/hr at 22, 45 mcg/min at 22  •  phenylephrine (NORBERTO-SYNEPHRINE) 160 mg in  mL Infusion, 0-300 mcg/min, Intravenous, Continuous, Daniel Paz M.D., Last Rate: 56.3 mL/hr at 2233, 300 mcg/min at 22  •  HYDROmorphone (DILAUDID) 0.2 mg/mL in 50mL NS (Continuous Infusion), , Intravenous, Continuous, Esteban Venegas M.D., Last Rate: 2.5 mL/hr at 22 0648, 0.5 mg/hr at 22  0648  •  HYDROmorphone (Dilaudid) injection 0.5-1 mg, 0.5-1 mg, Intravenous, Q HOUR PRN **OR** HYDROmorphone (DILAUDID) injection 1.5-2 mg, 1.5-2 mg, Intravenous, Q HOUR PRN, Esteban Venegas M.D.  •  piperacillin-tazobactam (ZOSYN) 4.5 g in  mL IVPB, 4.5 g, Intravenous, Once **AND** piperacillin-tazobactam (ZOSYN) 4.5 g in  mL IVPB, 4.5 g, Intravenous, Q8HRS, Esteban Venegas M.D.  •  Linezolid (ZYVOX) premix 600 mg, 600 mg, Intravenous, Q12HRS, Esteban Venegas M.D.  •  Respiratory Therapy Consult, , Nebulization, Continuous RT, Willard Domínguez Jr., D.O.  •  senna-docusate (PERICOLACE or SENOKOT S) 8.6-50 MG per tablet 2 Tablet, 2 Tablet, Enteral Tube, BID **AND** polyethylene glycol/lytes (MIRALAX) PACKET 1 Packet, 1 Packet, Enteral Tube, QDAY PRN **AND** magnesium hydroxide (MILK OF MAGNESIA) suspension 30 mL, 30 mL, Enteral Tube, QDAY PRN **AND** bisacodyl (DULCOLAX) suppository 10 mg, 10 mg, Rectal, QDAY PRN, Willard Domínguez Jr., D.O.  •  MD Alert...ICU Electrolyte Replacement per Pharmacy, , Other, PHARMACY TO DOSE, Willard Domínguez Jr., D.O.  •  lidocaine (XYLOCAINE) 1 % injection 2 mL, 2 mL, Tracheal Tube, Q30 MIN PRN, Willard Domínguez Jr., D.O.  •  [CANCELED] Notify MD and PharmD if FSBG level is less than or equal to 70 mg/dL or patient is showing signs/symptoms of hypoglycemia (tachycardia, palpitations, diaphoresis, clammy, tremulousness, nausea, confused), , , Once **AND** [CANCELED] Administer 20 grams of glucose (approximately 8 ounces of fruit juice) every 15 minutes PRN FSBS less than 70 mg/dL, , , PRN **AND** dextrose 10 % BOLUS 25 g, 25 g, Intravenous, Q15 MIN PRN, Willard Domínguez Jr., D.O.  •  vasopressin (VASOSTRICT) 20 Units in  mL Infusion, 0.04 Units/min, Intravenous, Continuous, Daniel Paz M.D., Last Rate: 12 mL/hr at 03/14/22 0735, 0.04 Units/min at 03/14/22 0735  •  hydrocortisone sodium succinate PF (Solu-CORTEF) 100 MG injection 50 mg, 50 mg,  Intravenous, Q6HRS, Willard Domínguez Jr., D.O., 50 mg at 03/14/22 0555  •  metoclopramide (REGLAN) injection 5 mg, 5 mg, Intravenous, Q6HRS, Willard Domínguez Jr., D.O., 5 mg at 03/14/22 0555  •  pantoprazole (Protonix) injection 40 mg, 40 mg, Intravenous, BID, Willard Domínguez Jr., D.O., 40 mg at 03/14/22 0555  •  heparin injection 2,800 Units, 2,800 Units, Intracatheter, ACUTE DIALYSIS PRN, Fadi Najjar, M.D., 2,800 Units at 03/13/22 2028  •  midazolam (VERSED) injection 1-4 mg, 1-4 mg, Intravenous, Q4HRS PRN, Hector Kat M.D., 4 mg at 03/13/22 1824  •  midazolam (VERSED) premix 125 mg/125 mL NS, 0-5 mg/hr, Intravenous, Continuous, Esteban Venegas M.D., Last Rate: 4 mL/hr at 03/14/22 0326, 4 mg/hr at 03/14/22 0326  •  sodium bicarbonate 150 mEq in D5W infusion (premix), , Intravenous, Continuous, Daniel Paz M.D., Last Rate: 150 mL/hr at 03/14/22 0946, New Bag at 03/14/22 0946  •  Pharmacy Consult Request, , Other, PHARMACY TO DOSE, Quinn Washington M.D.  •  [COMPLETED] detox IV 1000 mL (D5LR + magnesium 1 g + thiamine 100 mg + folic acid 1 mg) infusion, , Intravenous, Once, Stopped at 03/13/22 0302 **AND** thiamine (Vitamin B-1) tablet 100 mg, 100 mg, Oral, DAILY **AND** folic acid (FOLVITE) tablet 1 mg, 1 mg, Oral, DAILY **AND** multivitamin (THERAGRAN) tablet 1 Tablet, 1 Tablet, Oral, DAILY, Quinn Washington M.D.     Physical Exam:  Vitals:    03/14/22 0900   BP: (!) 106/29   Pulse: (!) 114   Resp: 20   Temp:    SpO2: (!) 87%        Physical Exam  Constitutional:       Comments: On ventilator   HENT:      Head: Normocephalic.      Mouth/Throat:      Pharynx: Oropharynx is clear.   Eyes:      General: Scleral icterus present.   Cardiovascular:      Comments: Sinus rhythm  Pulmonary:      Breath sounds: Rales (Crackles bilaterally) present. No wheezing.   Abdominal:      General: There is no distension.      Tenderness: There is no abdominal tenderness.   Musculoskeletal:      Cervical back: Normal  range of motion.      Right lower leg: Edema present.      Left lower leg: Edema present.   Neurological:      Comments: Sedated       Labs:  Recent Labs     03/13/22  1115 03/13/22 2117 03/14/22  0316   ISTATAPH 7.214* 7.292* 7.148*   ISTATAPCO2 42.3* 35.6 39.0*   ISTATAPO2 149* 78 83   ISTATATCO2 18* 18* 15*   [id number] 99 94 92*   ISTATARTHCO3 17.1 17.2 13.5*   ISTATARTBE -10* -9* -14*   ISTATTEMP 99.0 F 99.0 F 99.7 F   ISTATFIO2 100 40 70   ISTATSPEC Arterial Arterial Arterial   ISTATAPHTC 7.211* 7.289* 7.140*   XQVSSHGW1CI 150* 80 86         Recent Labs     03/13/22  0400 03/13/22  1252 03/13/22  1418 03/13/22  1843 03/14/22  0230 03/14/22  0641   SODIUM 134* 134*   < > 135 135 135   POTASSIUM 5.8* 6.9*   < > 5.1 6.4* 6.1*   CHLORIDE 110 108   < > 102 101 101   CO2 16* 15*   < > 18* 11* 12*   BUN 46* 51*   < > 41* 40* 40*   CREATININE 1.41* 2.10*   < > 1.82* 2.57* 2.89*   MAGNESIUM 1.9 2.1  --   --  2.1  --    PHOSPHORUS 4.0  --   --   --  8.3*  --    CALCIUM 7.7* 7.8*   < > 8.5 8.2* 7.7*    < > = values in this interval not displayed.     Recent Labs     03/12/22  1626 03/13/22  0400 03/13/22  1252 03/13/22  1418 03/13/22  1843 03/14/22  0230 03/14/22  0641   ALTSGPT 264*   < > 508* 542*  --  735*  --    ASTSGOT 401*   < > 953* 1066*  --  1610*  --    ALKPHOSPHAT 103*   < > 111* 107*  --  101*  --    TBILIRUBIN 0.9   < > 2.4* 2.8*  --  4.8*  --    LIPASE 131*  --   --   --   --   --   --    GLUCOSE 123*   < > 98 105* 97 52* 70    < > = values in this interval not displayed.     Recent Labs     03/12/22  1626 03/12/22  1631 03/13/22  0400 03/13/22  1252 03/13/22  1843 03/14/22  0230   RBC  --    < > 2.05* 3.21*  --  2.90*   HEMOGLOBIN  --    < > 6.9* 10.5* 10.3* 9.5*   HEMATOCRIT  --    < > 21.5* 32.4* 29.9* 29.7*   PLATELETCT  --    < > 160* 173  --  164   PROTHROMBTM 19.0*  --   --   --   --   --    APTT 39.8*  --   --   --   --   --    INR 1.64*  --   --   --   --   --     < > = values in this interval  not displayed.     Recent Labs     22  1631 22  0400 22  1252 22  1418 22  0230   WBC 7.8 11.6* 15.2*  --  29.0*   NEUTSPOLYS 55.70  --  74.30*  --   --    LYMPHOCYTES 33.90  --  11.60*  --   --    MONOCYTES 7.80  --  12.60  --   --    EOSINOPHILS 0.00  --  0.00  --   --    BASOPHILS 1.70  --  0.10  --   --    ASTSGOT  --  646* 953* 1066* 1610*   ALTSGPT  --  388* 508* 542* 735*   ALKPHOSPHAT  --  95 111* 107* 101*   TBILIRUBIN  --  1.3 2.4* 2.8* 4.8*       Imaging:  DX-CHEST-PORTABLE (1 VIEW)   Final Result      No significant interval change.      DX-CHEST-LIMITED (1 VIEW)   Final Result      Left internal jugular catheter placement with the tip projecting over the superior vena cava. No pneumothorax is identified. Exam is otherwise unchanged.      DX-CHEST-PORTABLE (1 VIEW)   Final Result      New right IJ central venous catheter is appropriately positioned. No appreciable right pneumothorax.      DX-CHEST-PORTABLE (1 VIEW)   Final Result      Endotracheal tube has been retracted and is appropriately positioned.      DX-CHEST-PORTABLE (1 VIEW)   Final Result      1.  Endotracheal tube tip is approximately 1.3 cm from the heladio.   2.  Low inspiration.      CT-ABDOMEN-PELVIS WITH   Final Result         1. Moderate abdominal and pelvic ascites. No discrete fluid collection identified.      2. Mild diffuse wall thickening of the colon and small bowel could relate to ascites and liver disease. No small bowel obstruction.      3. Cholelithiasis.      EC-ECHOCARDIOGRAM COMPLETE W/O CONT    (Results Pending)        Assessment:  47 yo with recent history of liver cirrhosis at [facility] who presented with hematemesis and melena s/p EGD with esophageal varices  1.  Esophageal varices  2.  Liver cirrhosis  3.  Alcohol use  4.  Hepatitis C      Plan:  Patient was placed on comfort care and unfortunately

## 2022-03-14 NOTE — CARE PLAN
"  Problem: Pain - Standard  Goal: Alleviation of pain or a reduction in pain to the patient’s comfort goal  Outcome: Met     Problem: Knowledge Deficit - Standard  Goal: Patient and family/care givers will demonstrate understanding of plan of care, disease process/condition, diagnostic tests and medications  Outcome: Met     Problem: Hemodynamics  Goal: Patient's hemodynamics, fluid balance and neurologic status will be stable or improve  Outcome: Met     Problem: Fluid Volume  Goal: Fluid volume balance will be maintained  Outcome: Met     Problem: Skin Integrity  Goal: Skin integrity is maintained or improved  Outcome: Met     Problem: Safety - Medical Restraint  Goal: Remains free of injury from restraints (Restraint for Interference with Medical Device)  Outcome: Met  Goal: Free from restraint(s) (Restraint for Interference with Medical Device)  Outcome: Met   The patient is Unstable - High likelihood or risk of patient condition declining or worsening    Shift Goals  Clinical Goals: Hemodynamic Stability, decrease vasopressors and sedation  Patient Goals: \"Get better\"  Family Goals: n/a    Progress made toward(s) clinical / shift goals:  n    Patient is not progressing towards the following goals:    Pt   "

## 2022-03-14 NOTE — CARE PLAN
"  Problem: Pain - Standard  Goal: Alleviation of pain or a reduction in pain to the patient’s comfort goal  Outcome: Not Met     Problem: Knowledge Deficit - Standard  Goal: Patient and family/care givers will demonstrate understanding of plan of care, disease process/condition, diagnostic tests and medications  Outcome: Not Met     Problem: Hemodynamics  Goal: Patient's hemodynamics, fluid balance and neurologic status will be stable or improve  Outcome: Not Met     Problem: Fluid Volume  Goal: Fluid volume balance will be maintained  Outcome: Not Met     Problem: Skin Integrity  Goal: Skin integrity is maintained or improved  Outcome: Not Met   The patient is Unstable - High likelihood or risk of patient condition declining or worsening    Shift Goals  Clinical Goals: Bp mgmt, pain mgmt  Patient Goals: \"Get better\"  Family Goals: n/a    Progress made toward(s) clinical / shift goals:  no    Patient is not progressing towards the following goals:      Problem: Pain - Standard  Goal: Alleviation of pain or a reduction in pain to the patient’s comfort goal  Outcome: Not Met     Problem: Knowledge Deficit - Standard  Goal: Patient and family/care givers will demonstrate understanding of plan of care, disease process/condition, diagnostic tests and medications  Outcome: Not Met     Problem: Hemodynamics  Goal: Patient's hemodynamics, fluid balance and neurologic status will be stable or improve  Outcome: Not Met     Problem: Fluid Volume  Goal: Fluid volume balance will be maintained  Outcome: Not Met     Problem: Skin Integrity  Goal: Skin integrity is maintained or improved  Outcome: Not Met     "

## 2022-03-14 NOTE — DISCHARGE PLANNING
Care Transition Team Discharge Planning    Anticipated Discharge Disposition: wait for medical stability to assess for d/c needs    Action: LSw attended rounds, pt is on vent day 2. Pt has reactive pupils but other reflexes are not currently present: gag/cough. Pt has received blood. Pt has edema throughout. Pt's family is involved.     Barriers to Discharge: medical clearance    Plan: Lsw will continue to follow, and assist w/ d/c planning.

## 2022-03-14 NOTE — PROGRESS NOTES
Called to evaluate this 46-year-old male admitted with cirrhosis and variceal bleeding.  Was seen earlier in the evening for refractory shock.  Continuing to worsen, H&H not indicative of active hemorrhage or no clinical signs of bleeding at this time.    I performed bedside ultrasound shows hyperdynamic LV/RV, IVC is ~2 cm and partially collapses with inspiration (may be 20 to 30%), no pericardial effusion, RV is not dilated.  He has worsening acidosis, no other clear explanation for his refractory shock.    Liver function worsening, renal functioning worsening, acidosis worsening.  He is declining fairly rapidly and there does not appear to be a readily reversible cause.    I performed the following interventions:  -Bedside ultrasound to assess cardiac function  -1 unit PRBC  -1 unit crystalloid  -Albumin administered  -5 units regular insulin IV +1 amp of D50 for hyperkalemia  -Pending ionized calcium --> if low will give replacement  -I increased his bicarb rate from 83 to 150 cc/h      Unfortunately despite these interventions I fear he is actively dying from complications of his cirrhosis.      Addendum:  iCal is mild low, will replete.      I called in his points of contact.  Spent ~45 minutes in the room with them explaining everything that had happened.  I told them he is dying, and I don't know that he'll survive the day.  We discussed code status, he is now DNAR/I ok.  Will continue care for now.  He will need dialysis for refractory acidosis + hyperkalemia, but I told them it's unlikely he will tolerate it.  We discussed why transplant is not an option (never been evaluated), and how there are unfortunately no more interventions to offer.  So we will continue on full support, but they are prepared for the worst.    Daniel Paz M.D.

## 2022-03-14 NOTE — PROGRESS NOTES
[facility] Progress Note    Stat hemodialysis treatment # 1 x 3 hours completed as ordered per Dr. Najjar due to high K 7.2 /RUPAL.  Treatment started at 1723 and ended at 2023.  1 K bath used during first 2 hours then switched to 2 K during last hour due to improved K of 5.1     Net UF Removed: 0 mL per orders    S/p Left IJ non tunneled triple lumen HD catheter CVC placement with good patency and flow, verified okay to use  Patient tolerated treatment with vasopressors x 3 titrated/inittiated by PCN to keep MAP 65mmHg/ BP support. Arterial SBPs 90s-110s, DBPs 40s-60s  See Acute HD flow sheets on clinical data notes under media for details.      Post tx access: Left IJ non tunneled triple lumen HD catheter flushed with saline then locked with heparin 1000 units/ml per designated amount in each lumen (see MAR) then clamped, capped aseptically and labeled properly. CVC dressings clean, dry, intact. No s/s of infection to HD catheter site. Heparin lock to be aspirated prior to next dialysis/CVC use by dialysis RN only. Please do not flush or draw from ports.    Please notify Nephrologist/Dialysis for follow-up.     Report given to primary care nurse IDA Muñoz RN.

## 2022-03-14 NOTE — PROGRESS NOTES
Critical Care Progress Note    Date of admission  3/12/2022    Chief Complaint  46 y.o. male admitted 3/12/2022 with an upper GI bleed.  He has a history of cirrhosis.    Hospital Course      3/14 -    vent day 2.  Severe shock.  Titrating vasopressors.      Interval Problem Update  Reviewed last 24 hour events:      100.0  +9020 mL in the last 24  +15,062 mL since admit  SR  Vaso  Dale  NE  Versed  Dilaudid      Review of Systems  Review of Systems   Unable to perform ROS: Acuity of condition        Vital Signs for last 24 hours   Temp:  [36.9 °C (98.4 °F)-37.8 °C (100 °F)] 37.4 °C (99.3 °F)  Pulse:  [] 114  Resp:  [9-32] 20  BP: ()/(29-50) 106/29  SpO2:  [81 %-98 %] 87 %    Hemodynamic parameters for last 24 hours       Respiratory Information for the last 24 hours  Vent Mode: APVCMV  Rate (breaths/min): 24  Vt Target (mL): 450  PEEP/CPAP: 14  MAP: 22  Control VTE (exp VT): 786    Physical Exam   Physical Exam  Constitutional:       Comments: On ventilator   HENT:      Head: Normocephalic.      Mouth/Throat:      Pharynx: Oropharynx is clear.   Eyes:      General: Scleral icterus present.   Cardiovascular:      Comments: Sinus rhythm  Pulmonary:      Breath sounds: Rales (Crackles bilaterally) present. No wheezing.   Abdominal:      General: There is no distension.      Tenderness: There is no abdominal tenderness.   Musculoskeletal:      Cervical back: Normal range of motion.      Right lower leg: Edema present.      Left lower leg: Edema present.   Neurological:      Comments: Sedated         Medications  Current Facility-Administered Medications   Medication Dose Route Frequency Provider Last Rate Last Admin   • albumin human 25% solution 40 g  40 g Intravenous DAILY Daniel Paz M.D. 150 mL/hr at 03/14/22 0415 40 g at 03/14/22 0415   • norepinephrine (Levophed) infusion 32 mg/500 mL (premix)  0-45 mcg/min Intravenous Continuous Daniel Paz M.D. 42.2 mL/hr at 03/14/22 0533 45 mcg/min at 03/14/22  0533   • phenylephrine (NORBERTO-SYNEPHRINE) 160 mg in  mL Infusion  0-300 mcg/min Intravenous Continuous Daniel Paz M.D. 56.3 mL/hr at 03/14/22 0533 300 mcg/min at 03/14/22 0533   • HYDROmorphone (DILAUDID) 0.2 mg/mL in 50mL NS (Continuous Infusion)   Intravenous Continuous Esteban Venegas M.D. 2.5 mL/hr at 03/14/22 0648 0.5 mg/hr at 03/14/22 0648   • HYDROmorphone (Dilaudid) injection 0.5-1 mg  0.5-1 mg Intravenous Q HOUR PRN Esteban Venegas M.D.        Or   • HYDROmorphone (DILAUDID) injection 1.5-2 mg  1.5-2 mg Intravenous Q HOUR PRN Esteban Venegas M.D.       • piperacillin-tazobactam (ZOSYN) 4.5 g in  mL IVPB  4.5 g Intravenous Once Esteban Venegas M.D.        And   • piperacillin-tazobactam (ZOSYN) 4.5 g in  mL IVPB  4.5 g Intravenous Q8HRS Esteban Venegas M.D.       • Linezolid (ZYVOX) premix 600 mg  600 mg Intravenous Q12HRS Esteban Venegas M.D.       • Respiratory Therapy Consult   Nebulization Continuous RT Willard Domínguez Jr., ELODIA.O.       • senna-docusate (PERICOLACE or SENOKOT S) 8.6-50 MG per tablet 2 Tablet  2 Tablet Enteral Tube BID Willard Domínguez Jr., D.O.        And   • polyethylene glycol/lytes (MIRALAX) PACKET 1 Packet  1 Packet Enteral Tube QDAY PRN Willard Domínguez Jr., D.O.        And   • magnesium hydroxide (MILK OF MAGNESIA) suspension 30 mL  30 mL Enteral Tube QDAY PRN Willard Domínguez Jr., D.O.        And   • bisacodyl (DULCOLAX) suppository 10 mg  10 mg Rectal QDAY PRN Willard Domínguez Jr., D.O.       • MD Alert...ICU Electrolyte Replacement per Pharmacy   Other PHARMACY TO DOSE ELODIA Gramajo Jr..O.       • lidocaine (XYLOCAINE) 1 % injection 2 mL  2 mL Tracheal Tube Q30 MIN PRN ELODIA Gramajo Jr..O.       • dextrose 10 % BOLUS 25 g  25 g Intravenous Q15 MIN PRN ELODIA Gramajo Jr..O.       • vasopressin (VASOSTRICT) 20 Units in  mL Infusion  0.04 Units/min Intravenous Continuous Daniel Paz M.D. 12 mL/hr at  03/14/22 0735 0.04 Units/min at 03/14/22 0735   • hydrocortisone sodium succinate PF (Solu-CORTEF) 100 MG injection 50 mg  50 mg Intravenous Q6HRS Willard Domínguez Jr., D.O.   50 mg at 03/14/22 0555   • metoclopramide (REGLAN) injection 5 mg  5 mg Intravenous Q6HRS Willard Domínguez Jr., D.O.   5 mg at 03/14/22 0555   • pantoprazole (Protonix) injection 40 mg  40 mg Intravenous BID Willard Domínguez Jr., D.O.   40 mg at 03/14/22 0555   • heparin injection 2,800 Units  2,800 Units Intracatheter ACUTE DIALYSIS PRN Fadi Najjar, M.D.   2,800 Units at 03/13/22 2028   • midazolam (VERSED) injection 1-4 mg  1-4 mg Intravenous Q4HRS PRN Hector Kat M.D.   4 mg at 03/13/22 1824   • midazolam (VERSED) premix 125 mg/125 mL NS  0-5 mg/hr Intravenous Continuous Esteban Venegas M.D. 4 mL/hr at 03/14/22 0326 4 mg/hr at 03/14/22 0326   • sodium bicarbonate 150 mEq in D5W infusion (premix)   Intravenous Continuous Daniel Paz M.D. 150 mL/hr at 03/14/22 0946 New Bag at 03/14/22 0946   • Pharmacy Consult Request   Other PHARMACY TO DOSE Quinn Washington M.D.       • thiamine (Vitamin B-1) tablet 100 mg  100 mg Oral DAILY Quinn Washington M.D.        And   • folic acid (FOLVITE) tablet 1 mg  1 mg Oral DAILY Quinn Washington M.D.        And   • multivitamin (THERAGRAN) tablet 1 Tablet  1 Tablet Oral DAILY Quinn Washington M.D.           Fluids    Intake/Output Summary (Last 24 hours) at 3/14/2022 0954  Last data filed at 3/14/2022 0600  Gross per 24 hour   Intake 7787.75 ml   Output 580 ml   Net 7207.75 ml       Laboratory  Recent Labs     03/13/22  1115 03/13/22  2117 03/14/22  0316   ISTATAPH 7.214* 7.292* 7.148*   ISTATAPCO2 42.3* 35.6 39.0*   ISTATAPO2 149* 78 83   ISTATATCO2 18* 18* 15*   [id number] 99 94 92*   ISTATARTHCO3 17.1 17.2 13.5*   ISTATARTBE -10* -9* -14*   ISTATTEMP 99.0 F 99.0 F 99.7 F   ISTATFIO2 100 40 70   ISTATSPEC Arterial Arterial Arterial   ISTATAPHTC 7.211* 7.289* 7.140*   [id number] 150*  80 86         Recent Labs     03/13/22  0400 03/13/22  1252 03/13/22  1418 03/13/22  1843 03/14/22  0230 03/14/22  0641   SODIUM 134* 134*   < > 135 135 135   POTASSIUM 5.8* 6.9*   < > 5.1 6.4* 6.1*   CHLORIDE 110 108   < > 102 101 101   CO2 16* 15*   < > 18* 11* 12*   BUN 46* 51*   < > 41* 40* 40*   CREATININE 1.41* 2.10*   < > 1.82* 2.57* 2.89*   MAGNESIUM 1.9 2.1  --   --  2.1  --    PHOSPHORUS 4.0  --   --   --  8.3*  --    CALCIUM 7.7* 7.8*   < > 8.5 8.2* 7.7*    < > = values in this interval not displayed.     Recent Labs     03/12/22  1626 03/13/22  0400 03/13/22  1252 03/13/22 1418 03/13/22  1843 03/14/22  0230 03/14/22  0641   ALTSGPT 264*   < > 508* 542*  --  735*  --    ASTSGOT 401*   < > 953* 1066*  --  1610*  --    ALKPHOSPHAT 103*   < > 111* 107*  --  101*  --    TBILIRUBIN 0.9   < > 2.4* 2.8*  --  4.8*  --    LIPASE 131*  --   --   --   --   --   --    GLUCOSE 123*   < > 98 105* 97 52* 70    < > = values in this interval not displayed.     Recent Labs     03/12/22  1631 03/13/22  0400 03/13/22  1252 03/13/22  1418 03/14/22  0230   WBC 7.8 11.6* 15.2*  --  29.0*   NEUTSPOLYS 55.70  --  74.30*  --   --    LYMPHOCYTES 33.90  --  11.60*  --   --    MONOCYTES 7.80  --  12.60  --   --    EOSINOPHILS 0.00  --  0.00  --   --    BASOPHILS 1.70  --  0.10  --   --    ASTSGOT  --  646* 953* 1066* 1610*   ALTSGPT  --  388* 508* 542* 735*   ALKPHOSPHAT  --  95 111* 107* 101*   TBILIRUBIN  --  1.3 2.4* 2.8* 4.8*     Recent Labs     03/12/22  1626 03/12/22  1631 03/13/22  0400 03/13/22  1252 03/13/22  1843 03/14/22  0230   RBC  --    < > 2.05* 3.21*  --  2.90*   HEMOGLOBIN  --    < > 6.9* 10.5* 10.3* 9.5*   HEMATOCRIT  --    < > 21.5* 32.4* 29.9* 29.7*   PLATELETCT  --    < > 160* 173  --  164   PROTHROMBTM 19.0*  --   --   --   --   --    APTT 39.8*  --   --   --   --   --    INR 1.64*  --   --   --   --   --     < > = values in this interval not displayed.       Imaging  X-Ray:  I have personally reviewed the  images and compared with prior images. and My impression is: Unchanged bilateral opacities    Assessment/Plan  * Acute upper GI bleed- (present on admission)  Assessment & Plan  Acute upper gastrointestinal hemorrhage secondary to esophageal varices  S/P EGD with banding on 3/13  Continue IV PPI twice daily  Change ceftriaxone to Zosyn and linezolid for possible development of septic shock  Trend hemoglobin and transfuse PRBCs to keep hemoglobin greater than 7    Shock (HCC)  Assessment & Plan  Acute undifferentiated shock, not otherwise specified  Differential diagnosis includes septic shock, vasodistributive shock, relative adrenal insufficiency  I am titrating norepinephrine and phenylephrine to keep mean arterial pressure greater than 65  Culture blood and sputum  Change ceftriaxone to Zosyn and linezolid  Check nasal RSA PCR    Acute respiratory failure with hypoxia (HCC)- (present on admission)  Assessment & Plan  Intubated 3/13  ABCDEF bundle  SAT/SBT as appropriate  Mobility level 1    Hemorrhagic shock (HCC)- (present on admission)  Assessment & Plan  Hemorrhagic shock due to acute esophageal variceal hemorrhage  Hemorrhagic shock has resolved    Adrenal insufficiency (HCC)  Assessment & Plan  Continue hydrocortisone, 50 mg IV every 6 hours    RUPAL (acute kidney injury) (HCC)- (present on admission)  Assessment & Plan  Suspect ATN due to shock  Monitor renal function and urine output  Avoid nephrotoxins and renal dose medications  Bicarbonate drip    Hepatitis C test positive- (present on admission)  Assessment & Plan  Hepatitis C antibody positive  Hepatitis C viral load by PCR pending    Cirrhosis of liver with ascites (HCC)- (present on admission)  Assessment & Plan  Due to combination of alcohol and hepatitis  Change ceftriaxone to Zosyn and linezolid  MELD 27  Discriminant function 35  Child class C    Acute blood loss anemia- (present on admission)  Assessment & Plan  Gastrointestinal source of blood  loss  Trend hemoglobin and transfuse PRBCs to keep hemoglobin greater than 7       VTE:  Contraindicated  Ulcer: PPI  Lines: Central Line  Ongoing indication addressed, Arterial Line  Ongoing indication addressed and Roque Catheter  Ongoing indication addressed    I have performed a physical exam and reviewed and updated ROS and Plan today (3/14/2022). In review of yesterday's note (3/13/2022), there are no changes except as documented above.     I have assessed and reassessed his respiratory status with ventilator adjustments, airway mechanics, ventilator waveforms, blood pressure, hemodynamics, cardiovascular status with titration of vasopressors and his neurologic status.  He is at high risk for worsening respiratory, cardiovascular and renal dysfunction.    Discussed patient condition and risk of morbidity and/or mortality with RN, RT, Pharmacy, Charge nurse / hot rounds and QA team     The patient remains critically ill.  Critical care time = 124 minutes in directly providing and coordinating critical care and extensive data review.  No time overlap and excludes procedures.    Esteban Venegas MD  Pulmonary and Critical Care Medicine

## 2022-03-16 LAB
BACTERIA SPEC RESP CULT: NORMAL
GRAM STN SPEC: NORMAL
SIGNIFICANT IND 70042: NORMAL
SITE SITE: NORMAL
SOURCE SOURCE: NORMAL

## 2022-03-18 LAB
HCV RNA SERPL NAA+PROBE-ACNC: ABNORMAL IU/ML
HCV RNA SERPL NAA+PROBE-ACNC: ABNORMAL IU/ML
HCV RNA SERPL NAA+PROBE-LOG IU: 5.66 LOG IU/ML
HCV RNA SERPL NAA+PROBE-LOG IU: 6.02 LOG IU/ML
HCV RNA SERPL QL NAA+PROBE: DETECTED
HCV RNA SERPL QL NAA+PROBE: DETECTED

## 2022-03-19 LAB
BACTERIA BLD CULT: NORMAL
BACTERIA BLD CULT: NORMAL
SIGNIFICANT IND 70042: NORMAL
SIGNIFICANT IND 70042: NORMAL
SITE SITE: NORMAL
SITE SITE: NORMAL
SOURCE SOURCE: NORMAL
SOURCE SOURCE: NORMAL

## 2022-03-21 LAB — HCV GENTYP SERPL NAA+PROBE: NORMAL

## (undated) DEVICE — KIT ANESTHESIA W/CIRCUIT & 3/LT BAG W/FILTER (20EA/CA)

## (undated) DEVICE — CANISTER SUCTION RIGID RED 1500CC (40EA/CA)

## (undated) DEVICE — LACTATED RINGERS INJ 1000 ML - (14EA/CA 60CA/PF)

## (undated) DEVICE — FILM CASSETTE ENDO

## (undated) DEVICE — BITE BLOCK ADULT 60FR (100EA/CA)

## (undated) DEVICE — TUBE E-T HI-LO CUFF 8.0MM (10EA/PK)

## (undated) DEVICE — CANNULA O2 COMFORT SOFT EAR ADULT 7 FT TUBING (50/CA)

## (undated) DEVICE — KIT CUSTOM PROCEDURE SINGLE FOR ENDO  (15/CA)

## (undated) DEVICE — TUBE CONNECTING SUCTION - CLEAR PLASTIC STERILE 72 IN (50EA/CA)

## (undated) DEVICE — SPEEDBAND SUPERVIEW SUPER 7 (4/BX)

## (undated) DEVICE — FORCEP RADIAL JAW 4 STANDARD CAPACITY W/NEEDLE 240CM (40EA/BX)

## (undated) DEVICE — ELECTRODE 850 FOAM ADHESIVE - HYDROGEL RADIOTRNSPRNT (50/PK)

## (undated) DEVICE — TOWEL STOP TIMEOUT SAFETY FLAG (40EA/CA)

## (undated) DEVICE — WATER IRRIGATION STERILE 1000ML (12EA/CA)